# Patient Record
Sex: MALE | Race: WHITE
[De-identification: names, ages, dates, MRNs, and addresses within clinical notes are randomized per-mention and may not be internally consistent; named-entity substitution may affect disease eponyms.]

---

## 2017-02-06 ENCOUNTER — HOSPITAL ENCOUNTER (EMERGENCY)
Dept: HOSPITAL 25 - UCEAST | Age: 42
Discharge: LEFT BEFORE DISCHARGE | End: 2017-02-06
Payer: COMMERCIAL

## 2017-02-06 VITALS — SYSTOLIC BLOOD PRESSURE: 120 MMHG | DIASTOLIC BLOOD PRESSURE: 78 MMHG

## 2017-02-06 DIAGNOSIS — R06.00: Primary | ICD-10-CM

## 2017-02-06 DIAGNOSIS — J81.1: ICD-10-CM

## 2017-02-06 DIAGNOSIS — F17.210: ICD-10-CM

## 2017-02-06 DIAGNOSIS — J98.01: ICD-10-CM

## 2017-02-06 PROCEDURE — 71020: CPT

## 2017-02-06 PROCEDURE — 99212 OFFICE O/P EST SF 10 MIN: CPT

## 2017-02-06 PROCEDURE — 93005 ELECTROCARDIOGRAM TRACING: CPT

## 2017-02-06 PROCEDURE — G0463 HOSPITAL OUTPT CLINIC VISIT: HCPCS

## 2017-02-06 NOTE — UC
Respiratory Complaint HPI





- HPI Summary


HPI Summary: 





The patient comes in today for:





1.   Shortness of breath:





Onset: 2-3 days ago.  


Palliative/provocative:  Inhaler he had at home (albuterol) helped. 


Quality: Cough, barky


Region: Lungs


Severity: Just before he came in dyspnea was 7/10 but now after nebulizer 4/10


Time:  Constant.


Associated symptoms:


   Chest pain: None.


   Dyspnea: Present.


   Lung disease:  Bronchitis, but he denies any asthma or emphysema.  HE was 

given albuterol inhaler back in November of 2016.  He had a bronchitis and he 

got a "Z-pack and albuterol inhaler."  He got a breathing treatment also.  He 

did not get any oxygen.  


   Heart disease, but message from the nurse was that he had cardiomyopathy.


   Sleep apnea:  Present.  He has not seen anyone for this "not for a while." 

He has not been on any treatment (i.e. CPAP) at home.  He has not had any 

echocardiogram.


   Regular PCP: Dr. Quezada. His last visit was in August.  He has an 

appointment on the 20th of this month.  He was seen in August for a regular 

physical (for his job as a ). He denies seeing any other providers 

such as specialists. 


   Cough production:  Yellow green.


   Rhinitis:  Yellow/green


   Upper tooth pain: None


   Sinus pressure:  Frontal and maxillary.


   Oxygen at home: None.








*





- History of Current Complaint


Chief Complaint: UCRespiratory


Stated Complaint: URI


Time Seen by Provider: 02/06/17 18:20


Hx Obtained From: Patient, Family/Caretaker





- Allergies/Home Medications


Allergies/Adverse Reactions: 


 Allergies











Allergy/AdvReac Type Severity Reaction Status Date / Time


 


[place] Allergy Severe Swelling Uncoded 02/06/17 17:48














PMH/Surg Hx/FS Hx/Imm Hx


Previously Healthy: No


Endocrine History Of: Reports: Diabetes - DM x 3 years.  On Rx, FBS ,


   Denies: Thyroid Disease


Cardiovascular History Of: 


   Denies: Cardiac Disorders, Hypertension, Pacemaker/ICD, Myocardial Infarction

, Congestive Heart Failure, Atrial Fibrillation, Deep Vein Thrombosis, Bleeding 

Disorders


Respiratory History Of: Reports: Bronchitis - Last dx Nov of 2016--used 

albuteral MDI then, but not after.


   Denies: COPD, Asthma


GI/ History Of: 


   Denies: Gastroesophageal Reflux, Ulcer, Gastrointestinal Bleed, Gall Bladder 

Disease, Kidney Stones, Diverticulitis, Renal Disease, Urosepsis


Neurological History Of: 


   Denies: TIA, CVA, Dementia, Seizures, Migraine


Psychological History Of: Reports: Depression


   Denies: Anxiety, Bipolar Disorder, Schizophrenia, Post Traumatic Stress 

Disorder


Cancer History Of: 


   Denies: Lung Cancer, Colorectal Cancer, Breast Cancer, Prostate Cancer, 

Cervical Cancer


Other History Of: Anticoagulant Therapy


   Negative For: HIV, Hepatitis B, Hepatitis C





- Surgical History


Surgical History: None





- Family History


Known Family History: Positive: Cardiac Disease, Hypertension, Diabetes, 

Seizure Disorder





- Social History


Occupation: Employed Full-time


Alcohol Use: Rare


Substance Use Type: Marijuana


Substance Use Comment - Amount & Last Used: 7/27/14


Smoking Status (MU): Current Every Day Smoker


Type: Cigarettes


Amount Used/How Often: 1 PPD


Household Exposure Type: Cigarettes





- Immunization History


Most Recent Influenza Vaccination: never


Most Recent Tetanus Shot: doesn't remember


Most Recent Pneumonia Vaccination: never





Review of Systems


Constitutional: Fever - He c/o fever, 100.2 at home.


Skin: Negative


Eyes: Negative


ENT: Nasal Discharge


Respiratory: Shortness Of Breath, Cough


Gastrointestinal: Diarrhea - Diarrhea today:  two stools today, mucous in the 

first one.


Genitourinary: Negative


All Other Systems Reviewed And Are Negative: Yes





Physical Exam


Triage Information Reviewed: Yes


Appearance: Well-Appearing, Well-Nourished, Obese


Vital Signs: 


 Initial Vital Signs











Temp  97.9 F   02/06/17 17:43


 


Pulse  79   02/06/17 17:43


 


Resp  24   02/06/17 17:43


 


BP  136/89   02/06/17 17:43


 


Pulse Ox  94   02/06/17 17:43











Vital Signs Reviewed: Yes


Eyes: Positive: Conjunctiva Clear


ENT: Positive: Hearing grossly normal, Pharyngeal erythema, Other: - Mallampati 

score: 2.  Negative: Nasal congestion, Nasal drainage, TM bulging, TM dull, TM 

red, Tonsillar swelling, Tonsillar exudate


Dental: Negative: Gross Decay/Caries @, Dental Fracture @


Neck: Positive: Supple, Nontender, No Lymphadenopathy.  Negative: Nuchal 

Rigidity


Respiratory: Positive: Chest non-tender, No respiratory distress, No accessory 

muscle use - It was difficult to assess accessory muscle use due to increased 

adipose tissue overlying chest wall., Wheezing - Scattered, and end 

expiratory..  Negative: Crackles


Cardiovascular: Positive: RRR, No Murmur


Abdomen Description: Positive: Nontender, No Organomegaly, Soft.  Negative: 

Distended, Guarding


Musculoskeletal: Positive: Strength Intact, ROM Intact


Neurological: Positive: Alert, Muscle Tone Normal


Psychological: Positive: Age Appropriate Behavior, Consolable


Skin: Positive: Other - He has hyperpigmented, and cyanotic lower, edematous 

legs, but he states that this is his normal..  Negative: rashes





[facility] Diagnostic Evaluation





- Laboratory


O2 Sat by Pulse Oximetry: 94





- Radiology


Xray Interpretation: Positive (See Comments) - MILD PULMONARY INTERSTITIAL EDEMA


Radiology Interpretation Completed By: Radiologist





Respiratory Course/Dx





- Course


Course Of Treatment: Patient states that he feels much better after his DuoNeb 

treatment and that he feels well enough to go home.  However, I told him I was 

concerned about his breathing and lung sounds and suggested a CXR.  The 

radiologist read it as mild pulmonary edema.  I mentioned this to the patient 

and that a simple upper respiratory infection with bronchospasm should not have 

pulmonary edema found on CXR.  And therefore, I suggested that he go to the ER 

for a more in-depth evaluation.  However, he did not want to do that tonight 

and said that he would consider it in the AM.  He did want an antibiotic and 

bronchodilator, steroid inhaler and antibiotics.





- Differential Dx/Diagnosis


Provider Diagnoses: Dyspnea.  Mild pulmonary edema.  bronchospasm





Discharge





- Discharge Plan


Condition: Stable


Disposition: AGAINST MEDICAL ADVICE


Patient Education Materials:  Heart Failure (ED), Dyspnea (ED), Bronchospasm (ED

)


Forms:  *Work Release


Referrals: 


Kirstin HUTCHINSON,Hari GARCIA [Primary Care Provider] - As Soon As Possible (If you are 

not going to the ER at this time, please see your primary care provider as soon 

as you can.  If you get worse, please re-consider going to the ER. )

## 2017-02-06 NOTE — RAD
HISTORY: Shortness of breath, history of cardiomyopathy



COMPARISONS: July 31, 2014



VIEWS: 2: Frontal dual-energy and lateral views of the chest.



FINDINGS:

CARDIOMEDIASTINAL SILHOUETTE: The cardiomediastinal silhouette is normal.

RONY: The rony are normal.

PLEURA: The costophrenic angles are sharp. No pleural abnormalities are noted.

LUNG PARENCHYMA: There is mild coarse reticular pattern with prominence of the central

pulmonary vasculature.

ABDOMEN: The upper abdomen is clear. There is no subphrenic gas.

BONES AND SOFT TISSUES: No bone or soft tissue abnormalities are noted.

OTHER: None.



IMPRESSION:

MILD PULMONARY INTERSTITIAL EDEMA

## 2017-02-07 NOTE — UC
Progress





- Progress Note


Progress Note: 





The patient was called today to see how well he was doing.  He did not answer 

my call, but a message was left that if he is having any problems or other 

issues, he is welcome to come back to see us.

## 2017-05-20 ENCOUNTER — HOSPITAL ENCOUNTER (EMERGENCY)
Dept: HOSPITAL 25 - ED | Age: 42
LOS: 1 days | Discharge: HOME | End: 2017-05-21
Payer: COMMERCIAL

## 2017-05-20 VITALS — SYSTOLIC BLOOD PRESSURE: 169 MMHG | DIASTOLIC BLOOD PRESSURE: 97 MMHG

## 2017-05-20 DIAGNOSIS — L02.214: Primary | ICD-10-CM

## 2017-05-20 DIAGNOSIS — F17.210: ICD-10-CM

## 2017-05-20 DIAGNOSIS — Z79.01: ICD-10-CM

## 2017-05-20 DIAGNOSIS — E11.65: ICD-10-CM

## 2017-05-20 LAB
ALBUMIN SERPL BCG-MCNC: 3.5 G/DL (ref 3.2–5.2)
ALP SERPL-CCNC: 45 U/L (ref 34–104)
ALT SERPL W P-5'-P-CCNC: 21 U/L (ref 7–52)
ANION GAP SERPL CALC-SCNC: 6 MMOL/L (ref 2–11)
AST SERPL-CCNC: 12 U/L (ref 13–39)
BUN SERPL-MCNC: 9 MG/DL (ref 6–24)
BUN/CREAT SERPL: 12.9 (ref 8–20)
CALCIUM SERPL-MCNC: 9 MG/DL (ref 8.6–10.3)
CHLORIDE SERPL-SCNC: 97 MMOL/L (ref 101–111)
GLOBULIN SER CALC-MCNC: 3.2 G/DL (ref 2–4)
GLUCOSE SERPL-MCNC: 330 MG/DL (ref 70–100)
HCO3 SERPL-SCNC: 27 MMOL/L (ref 22–32)
HCT VFR BLD AUTO: 48 % (ref 42–52)
HGB BLD-MCNC: 16.5 G/DL (ref 14–18)
MCH RBC QN AUTO: 29 PG (ref 27–31)
MCHC RBC AUTO-ENTMCNC: 34 G/DL (ref 31–36)
MCV RBC AUTO: 85 FL (ref 80–94)
POTASSIUM SERPL-SCNC: 3.8 MMOL/L (ref 3.5–5)
PROT SERPL-MCNC: 6.7 G/DL (ref 6.4–8.9)
RBC # BLD AUTO: 5.7 10^6/UL (ref 4–5.4)
SODIUM SERPL-SCNC: 130 MMOL/L (ref 133–145)
WBC # BLD AUTO: 11.9 10^3/UL (ref 3.5–10.8)

## 2017-05-20 PROCEDURE — 96374 THER/PROPH/DIAG INJ IV PUSH: CPT

## 2017-05-20 PROCEDURE — 87040 BLOOD CULTURE FOR BACTERIA: CPT

## 2017-05-20 PROCEDURE — 99282 EMERGENCY DEPT VISIT SF MDM: CPT

## 2017-05-20 PROCEDURE — 85610 PROTHROMBIN TIME: CPT

## 2017-05-20 PROCEDURE — 83605 ASSAY OF LACTIC ACID: CPT

## 2017-05-20 PROCEDURE — 96372 THER/PROPH/DIAG INJ SC/IM: CPT

## 2017-05-20 PROCEDURE — 85730 THROMBOPLASTIN TIME PARTIAL: CPT

## 2017-05-20 PROCEDURE — 85025 COMPLETE CBC W/AUTO DIFF WBC: CPT

## 2017-05-20 PROCEDURE — 10060 I&D ABSCESS SIMPLE/SINGLE: CPT

## 2017-05-20 PROCEDURE — 36415 COLL VENOUS BLD VENIPUNCTURE: CPT

## 2017-05-20 PROCEDURE — 80053 COMPREHEN METABOLIC PANEL: CPT

## 2017-05-21 NOTE — ED
Giovanni HANSEN Anna, scribed for Nicolas Gracia MD on 05/20/17 at 2300 .





GI/ HPI





- HPI Summary


HPI Summary: 


Patient is a 40 y/o male coming to [facility] presenting with the gradual onset of 

left-sided groin pain that began two days ago. He reports a sore area which 

began looking like a pimple but has grown in size and worsened in pain. He 

describes the severity of the pain as 9/10. Denies known hernias. He does not 

report digging in or otherwise bothering the sore. He reports feeling warm and 

having some diaphoresis and nausea. Denies emesis. He put Abx cream on the area 

but did not attempt drainage. The area was not draining when he took his shower 

this morning. The pain is exacerbated by movement. His history is significant 

for DM. 





Patient medications were reviewed this visit.





- History of Current Complaint


Chief Complaint: EDGeneral


Time Seen by Provider: 05/20/17 22:16


Stated Complaint: INFECTION BY PELVIS/ GENERAL


Hx Obtained From: Patient, Family/Caretaker - accompanied by wife


Onset/Duration: Started Days Ago, Still Present


Pain Intensity: 9





- Allergy/Home Medications


Allergies/Adverse Reactions: 


 Allergies











Allergy/AdvReac Type Severity Reaction Status Date / Time


 


[place] Allergy Severe Swelling Uncoded 02/06/17 17:48














PMH/Surg Hx/FS Hx/Imm Hx


Endocrine/Hematology History: Reports: Hx Anticoagulant Therapy, Hx Diabetes - 

DM x 3 years.  On Rx, FBS ,


   Denies: Hx Thyroid Disease


Cardiovascular History: 


   Denies: Hx Congestive Heart Failure, Hx Deep Vein Thrombosis, Hx Hypertension

, Hx Myocardial Infarction, Hx Pacemaker/ICD


Respiratory History: Reports: Hx Sleep Apnea


   Denies: Hx Asthma, Hx Chronic Obstructive Pulmonary Disease (COPD), Hx Lung 

Cancer


GI History: 


   Denies: Hx Gall Bladder Disease, Hx Gastrointestinal Bleed, Hx Ulcer, Hx 

Urosepsis


 History: 


   Denies: Hx Kidney Stones, Hx Renal Disease


Musculoskeletal History: Reports: Hx Back Problems


Neurological History: 


   Denies: Hx Dementia, Hx Migraine, Hx Seizures, Hx Transient Ischemic Attacks 

(TIA)


Psychiatric History: Reports: Hx Depression


   Denies: Hx Anxiety, Hx Schizophrenia, Hx Bipolar Disorder





- Immunization History


Date of Tetanus Vaccine: utd


Date of Influenza Vaccine: none


Infectious Disease History: No


Infectious Disease History: 


   Denies: Hx Hepatitis, Hx Human Immunodeficiency Virus (HIV), Traveled 

Outside the [place] in Last 30 Days





- Family History


Known Family History: Positive: Cardiac Disease, Hypertension, Diabetes, 

Respiratory Disease, Seizure Disorder





- Social History


Lives: With Family


Alcohol Use: Rare


Substance Use Type: Reports: Marijuana


Substance Use Comment - Amount & Last Used: 5/19/17


Hx Tobacco Use: Yes


Smoking Status (MU): Current Every Day Smoker


Type: Cigarettes


Amount Used/How Often: 1 PPD





Review of Systems


Positive: Skin Diaphoresis, Other - feeling warm


Negative: Erythema


Negative: Sore Throat


Negative: Chest Pain


Negative: Shortness Of Breath, Cough


Positive: Nausea.  Negative: Abdominal Pain, Vomiting, Diarrhea


Positive: pain.  Negative: dysuria, hematuria


Negative: Myalgia, Edema


Negative: Rash


Neurological: Other - Denies dizziness


All Other Systems Reviewed And Are Negative: Yes





Physical Exam





- Summary


Physical Exam Summary: 


Constitutional: Well-developed, Well-nourished, Alert. (-) Distressed


Skin: Fluctuant area to the left of his synthesis pubis. Warm, Dry


HENT: Normocephalic; Atraumatic


Eyes: Conjunctiva normal


Neck: Musculoskeletal ROM normal neck. (-) JVD, (-) Stridor, (-) Tracheal 

deviation


Cardio: Rhythm regular, rate normal, Heart sounds normal; Intact distal pulses; 

The pedal pulses are 2+ and symmetric. Radial pulses are 2+ and symmetric. (-) 

Murmur


Pulmonary/Chest wall: Effort normal. (-) Respiratory distress, (-) Wheezes, (-) 

Rales


Abd: Soft, (-) Tenderness, (-) Distension, (-) Guarding, (-) Rebound


Musculoskeletal: (-) Edema


Lymph: (-) Cervical adenopathy


Neuro: Alert, Oriented x3


Psych: Mood and affect Normal


Triage Information Reviewed: Yes


Vital Signs On Initial Exam: 


 Initial Vitals











Temp Pulse Resp BP Pulse Ox


 


 99.5 F   105   20   169/97   97 


 


 05/20/17 22:04  05/20/17 22:04  05/20/17 22:04  05/20/17 22:04  05/20/17 22:04











Vital Signs Reviewed: Yes





- Aby Coma Scale


Coma Scale Total: 15





Procedures





- Incision and Drainage


Site: left of synthesis pubis - moderate pus 


Anesthesia: Local


Instrument(s): Scalpel - 11 blade


Packing: Other - 1/4 inch packing





Diagnostics





- Vital Signs


 Vital Signs











  Temp Pulse Resp BP Pulse Ox


 


 05/20/17 22:13  99.5 F  105  18  169/97  95


 


 05/20/17 22:04  99.5 F  105  20  169/97  97














- Laboratory


Result Diagrams: 


 05/20/17 23:25





 05/20/17 23:25


Lab Statement: Any lab studies that have been ordered have been reviewed, and 

results considered in the medical decision making process.





GIGU Course/Dx





- Course


Assessment/Plan: Patient is a 40 y/o male coming to [facility] presenting with the 

gradual onset of left-sided groin pain that began two days ago. Incision and 

drainage was performed. Labs reveal WBC of 11.9 and glucose of 330. Patient 

will be discharged home with a prescription for Clindamycin and Tramadol. 

Patient and family are agreeable.





- Diagnoses


Provider Diagnoses: 


 Groin abscess, Hyperglycemia








Discharge





- Discharge Plan


Condition: Stable


Disposition: HOME


Prescriptions: 


Clindamycin CAP* [Cleocin 150 MG CAP*] 300 mg PO QID #40 cap


traMADol TAB* [Ultram*] 25 mg PO Q6HR PRN #10 tab MDD 4


 PRN Reason: Pain - Moderate To Severe


Patient Education Materials:  Clindamycin (By mouth), Tramadol (By mouth), 

Abscess (ED), Diabetic Hyperglycemia (ED)


Referrals: 


Hari Fulton MD [Primary Care Provider] - 


Additional Instructions: 


Follow up with primary care provider within 48 hours. Check blood sugar levels 

three times a day.


RETURN TO THE EMERGENCY DEPARTMENT FOR CHANGING OR WORSENING SYMPTOMS.





The documentation as recorded by the Giovanni bey Anna accurately reflects 

the service I personally performed and the decisions made by Basil senior Jerry, MD.

## 2017-05-24 ENCOUNTER — HOSPITAL ENCOUNTER (EMERGENCY)
Dept: HOSPITAL 25 - UCEAST | Age: 42
Discharge: LEFT BEFORE DISCHARGE | End: 2017-05-24
Payer: COMMERCIAL
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
## 2017-05-24 DIAGNOSIS — Z53.21: ICD-10-CM

## 2017-05-24 DIAGNOSIS — T14.8: Primary | ICD-10-CM

## 2018-02-12 ENCOUNTER — HOSPITAL ENCOUNTER (EMERGENCY)
Dept: HOSPITAL 25 - ED | Age: 43
LOS: 1 days | Discharge: HOME | End: 2018-02-13
Payer: COMMERCIAL

## 2018-02-12 DIAGNOSIS — R50.9: ICD-10-CM

## 2018-02-12 DIAGNOSIS — E11.9: ICD-10-CM

## 2018-02-12 DIAGNOSIS — R06.02: ICD-10-CM

## 2018-02-12 DIAGNOSIS — R07.89: Primary | ICD-10-CM

## 2018-02-12 DIAGNOSIS — F17.210: ICD-10-CM

## 2018-02-12 DIAGNOSIS — Z79.01: ICD-10-CM

## 2018-02-12 LAB
BASOPHILS # BLD AUTO: 0.1 10^3/UL (ref 0–0.2)
EOSINOPHIL # BLD AUTO: 0.2 10^3/UL (ref 0–0.6)
HCT VFR BLD AUTO: 49 % (ref 42–52)
HGB BLD-MCNC: 16.7 G/DL (ref 14–18)
INR PPP/BLD: 0.87 (ref 0.77–1.02)
LYMPHOCYTES # BLD AUTO: 2.8 10^3/UL (ref 1–4.8)
MCH RBC QN AUTO: 30 PG (ref 27–31)
MCHC RBC AUTO-ENTMCNC: 34 G/DL (ref 31–36)
MCV RBC AUTO: 86 FL (ref 80–94)
MONOCYTES # BLD AUTO: 0.6 10^3/UL (ref 0–0.8)
NEUTROPHILS # BLD AUTO: 6.7 10^3/UL (ref 1.5–7.7)
NRBC # BLD AUTO: 0 10^3/UL
NRBC BLD QL AUTO: 0.1
PLATELET # BLD AUTO: 279 10^3/UL (ref 150–450)
RBC # BLD AUTO: 5.65 10^6/UL (ref 4–5.4)
WBC # BLD AUTO: 10.5 10^3/UL (ref 3.5–10.8)

## 2018-02-12 PROCEDURE — 71045 X-RAY EXAM CHEST 1 VIEW: CPT

## 2018-02-12 PROCEDURE — 99283 EMERGENCY DEPT VISIT LOW MDM: CPT

## 2018-02-12 PROCEDURE — 82550 ASSAY OF CK (CPK): CPT

## 2018-02-12 PROCEDURE — 85379 FIBRIN DEGRADATION QUANT: CPT

## 2018-02-12 PROCEDURE — 84484 ASSAY OF TROPONIN QUANT: CPT

## 2018-02-12 PROCEDURE — 85025 COMPLETE CBC W/AUTO DIFF WBC: CPT

## 2018-02-12 PROCEDURE — 80053 COMPREHEN METABOLIC PANEL: CPT

## 2018-02-12 PROCEDURE — 83735 ASSAY OF MAGNESIUM: CPT

## 2018-02-12 PROCEDURE — 36415 COLL VENOUS BLD VENIPUNCTURE: CPT

## 2018-02-12 PROCEDURE — 93005 ELECTROCARDIOGRAM TRACING: CPT

## 2018-02-12 PROCEDURE — 85610 PROTHROMBIN TIME: CPT

## 2018-02-12 PROCEDURE — 85730 THROMBOPLASTIN TIME PARTIAL: CPT

## 2018-02-12 PROCEDURE — 83605 ASSAY OF LACTIC ACID: CPT

## 2018-02-12 PROCEDURE — 96372 THER/PROPH/DIAG INJ SC/IM: CPT

## 2018-02-12 NOTE — RAD
INDICATION: Chest pain



COMPARISON: Chest x-ray dated February 06, 2017

 

TECHNIQUE: Single AP view of the chest was obtained.



FINDINGS: 



The heart and mediastinum exhibit normal size and contour.



Similar to the prior chest x-ray there is fullness of the pulmonary vasculature with

diffuse mild reticulonodular density.



Visualized bones are normal for the patient's age.



IMPRESSION:  DEPENDING ON THE CLINICAL SETTING CHEST X-RAY FINDINGS COULD BE SEEN WITH

MILD PULMONARY EDEMA OR INCREASED PARENCHYMAL DENSITY RELATED TO VIRAL PNEUMONIA.

## 2018-02-13 VITALS — SYSTOLIC BLOOD PRESSURE: 136 MMHG | DIASTOLIC BLOOD PRESSURE: 80 MMHG

## 2018-02-13 NOTE — ED
Cintia HANSEN Thomas, scribed for Josse Avitia MD on 02/12/18 at 2324 .





HPI Chest Pain





- HPI Summary


HPI Summary: 





The patient is a 42 year old male presenting with chest pain that has been 

increasing over the last two weeks. The pain radiates to his neck. He describes 

a sensation of a pulled muscle. The pain is worse when he moves and breathes 

deeply. He reports he is a little short of breath. He denies any known 

trauma. 





- History of Current Complaint


Chief Complaint: EDChestWallPain


Time Seen by Provider: 02/12/18 22:53


Hx Obtained From: Patient


Onset/Duration: Started Weeks Ago - 2, Atraumatic, Still Present


Timing: Constant


Initial Severity: Mild


Current Severity: Severe


Pain Intensity: 9


Pain Scale Used: 0-10 Numeric


Chest Pain Radiates: Yes


Chest Pain Radiates To:: Neck


Aggravating Factor(s): Movement, Deep Breaths


Alleviating Factor(s): Nothing


Associated Signs and Symptoms: Positive: Chest Pain, Shortness of Breath - a 

little, Fever





- Allergy/Home Medications


Allergies/Adverse Reactions: 


 Allergies











Allergy/AdvReac Type Severity Reaction Status Date / Time


 


[place] Allergy Severe Swelling Uncoded 02/06/17 17:48














PMH/Surg Hx/FS Hx/Imm Hx


Endocrine/Hematology History: Reports: Hx Anticoagulant Therapy, Hx Diabetes - 

DM x 3 years.  On Rx, FBS ,


   Denies: Hx Thyroid Disease


Cardiovascular History: 


   Denies: Hx Congestive Heart Failure, Hx Deep Vein Thrombosis, Hx Hypertension

, Hx Myocardial Infarction, Hx Pacemaker/ICD


Respiratory History: Reports: Hx Sleep Apnea


   Denies: Hx Asthma, Hx Chronic Obstructive Pulmonary Disease (COPD), Hx Lung 

Cancer


GI History: 


   Denies: Hx Gall Bladder Disease, Hx Gastrointestinal Bleed, Hx Ulcer, Hx 

Urosepsis


 History: 


   Denies: Hx Kidney Stones, Hx Renal Disease


Musculoskeletal History: Reports: Hx Back Problems


Neurological History: 


   Denies: Hx Dementia, Hx Migraine, Hx Seizures, Hx Transient Ischemic Attacks 

(TIA)


Psychiatric History: Reports: Hx Depression


   Denies: Hx Anxiety, Hx Schizophrenia, Hx Bipolar Disorder





- Immunization History


Date of Tetanus Vaccine: utd


Date of Influenza Vaccine: none


Infectious Disease History: No


Infectious Disease History: 


   Denies: Hx Hepatitis, Hx Human Immunodeficiency Virus (HIV), Traveled 

Outside the [place] in Last 30 Days





- Family History


Known Family History: Positive: Cardiac Disease, Hypertension, Diabetes, 

Respiratory Disease, Seizure Disorder





- Social History


Alcohol Use: Rare


Substance Use Type: Reports: Marijuana


Substance Use Comment - Amount & Last Used: 5/19/17


Hx Tobacco Use: Yes


Smoking Status (MU): Current Every Day Smoker


Type: Cigarettes


Amount Used/How Often: 1 PPD





Review of Systems


Positive: Fever


Positive: Chest Pain


Positive: Shortness Of Breath - a little


All Other Systems Reviewed And Are Negative: Yes





Physical Exam





- Summary


Physical Exam Summary: 





VITAL SIGNS: Reviewed.


GENERAL: Patient is a morbidly obese MALE who is lying comfortable in the 

stretcher. Patient is not in any acute respiratory distress.


HEAD AND FACE: No signs of trauma. No ecchymosis, hematomas or skull 

depressions. No sinus tenderness.


EYES: PERRLA, EOMI x 2, No injected conjunctiva, no nystagmus.


EARS: Hearing grossly intact. Ear canals and tympanic membranes are within 

normal limits.


MOUTH: Oropharynx within normal limits.


NECK: Supple, trachea is midline, no adenopathy, no JVD, no carotid bruit, no c-

spine tenderness, neck with full ROM.


CHEST: Symmetric. He has diffuse tenderness in the upper chest wall on both 

sides. 


LUNGS: Clear to auscultation bilaterally. No wheezing or crackles.


CVS: Regular rate and rhythm, S1 and S2 present, no murmurs or gallops 

appreciated.


ABDOMEN: Soft, non-tender. No signs of distention. No rebound no guarding, and 

no masses palpated. Bowel sounds are normal.


EXTREMITIES: FROM in all major joints, no edema, no cyanosis or clubbing.


NEURO: Alert and oriented x 3. No acute neurological deficits. Speech is normal 

and follows commands.


SKIN: Dry and warm


Triage Information Reviewed: Yes


Vital Signs On Initial Exam: 


 Initial Vitals











Temp Pulse Resp BP Pulse Ox


 


 100.2 F   104   16   137/66   96 


 


 02/12/18 19:02  02/12/18 19:02  02/12/18 19:02  02/12/18 19:02  02/12/18 19:02











Vital Signs Reviewed: Yes





Diagnostics





- Vital Signs


 Vital Signs











  Temp Pulse Resp BP Pulse Ox


 


 02/12/18 23:19   72  16  132/80  96


 


 02/12/18 21:03  98.7 F  94   131/66  95


 


 02/12/18 19:02  100.2 F  104  16  137/66  96














- Laboratory


Lab Results: 


 Lab Results











  02/12/18 02/12/18 02/12/18 Range/Units





  21:10 21:10 21:10 


 


WBC  10.5    (3.5-10.8)  10^3/ul


 


RBC  5.65 H    (4.0-5.4)  10^6/ul


 


Hgb  16.7    (14.0-18.0)  g/dl


 


Hct  49    (42-52)  %


 


MCV  86    (80-94)  fL


 


MCH  30    (27-31)  pg


 


MCHC  34    (31-36)  g/dl


 


RDW  14    (10.5-15)  %


 


Plt Count  279    (150-450)  10^3/ul


 


MPV  8    (7.4-10.4)  um3


 


Neut % (Auto)  63.6    (38-83)  %


 


Lymph % (Auto)  27.1    (25-47)  %


 


Mono % (Auto)  6.1    (1-9)  %


 


Eos % (Auto)  2.1    (0-6)  %


 


Baso % (Auto)  1.1    (0-2)  %


 


Absolute Neuts (auto)  6.7    (1.5-7.7)  10^3/ul


 


Absolute Lymphs (auto)  2.8    (1.0-4.8)  10^3/ul


 


Absolute Monos (auto)  0.6    (0-0.8)  10^3/ul


 


Absolute Eos (auto)  0.2    (0-0.6)  10^3/ul


 


Absolute Basos (auto)  0.1    (0-0.2)  10^3/ul


 


Absolute Nucleated RBC  0    10^3/ul


 


Nucleated RBC %  0.1    


 


INR (Anticoag Therapy)     (0.77-1.02)  


 


APTT     (26.0-36.3)  seconds


 


D-Dimer, Quantitative     (Less Than 230)  ng/mL


 


Sodium   133   (133-145)  mmol/L


 


Potassium   4.2   (3.5-5.0)  mmol/L


 


Chloride   100 L   (101-111)  mmol/L


 


Carbon Dioxide   26   (22-32)  mmol/L


 


Anion Gap   7   (2-11)  mmol/L


 


BUN   10   (6-24)  mg/dL


 


Creatinine   0.64 L   (0.67-1.17)  mg/dL


 


Est GFR ( Amer)   175.5   (>60)  


 


Est GFR (Non-Af Amer)   136.5   (>60)  


 


BUN/Creatinine Ratio   15.6   (8-20)  


 


Glucose   310 H   ()  mg/dL


 


Lactic Acid    2.1 H*  (0.5-2.0)  mmol/L


 


Calcium   8.8   (8.6-10.3)  mg/dL


 


Magnesium   2.1   (1.9-2.7)  mg/dL


 


Total Bilirubin   0.40   (0.2-1.0)  mg/dL


 


AST   13   (13-39)  U/L


 


ALT   19   (7-52)  U/L


 


Alkaline Phosphatase   49   ()  U/L


 


Total Creatine Kinase   38   ()  U/L


 


Troponin I   0.00   (<0.04)  ng/mL


 


Total Protein   6.7   (6.4-8.9)  g/dL


 


Albumin   3.7   (3.2-5.2)  g/dL


 


Globulin   3.0   (2-4)  g/dL


 


Albumin/Globulin Ratio   1.2   (1-3)  














  02/12/18 Range/Units





  21:10 


 


WBC   (3.5-10.8)  10^3/ul


 


RBC   (4.0-5.4)  10^6/ul


 


Hgb   (14.0-18.0)  g/dl


 


Hct   (42-52)  %


 


MCV   (80-94)  fL


 


MCH   (27-31)  pg


 


MCHC   (31-36)  g/dl


 


RDW   (10.5-15)  %


 


Plt Count   (150-450)  10^3/ul


 


MPV   (7.4-10.4)  um3


 


Neut % (Auto)   (38-83)  %


 


Lymph % (Auto)   (25-47)  %


 


Mono % (Auto)   (1-9)  %


 


Eos % (Auto)   (0-6)  %


 


Baso % (Auto)   (0-2)  %


 


Absolute Neuts (auto)   (1.5-7.7)  10^3/ul


 


Absolute Lymphs (auto)   (1.0-4.8)  10^3/ul


 


Absolute Monos (auto)   (0-0.8)  10^3/ul


 


Absolute Eos (auto)   (0-0.6)  10^3/ul


 


Absolute Basos (auto)   (0-0.2)  10^3/ul


 


Absolute Nucleated RBC   10^3/ul


 


Nucleated RBC %   


 


INR (Anticoag Therapy)  0.87  (0.77-1.02)  


 


APTT  32.4  (26.0-36.3)  seconds


 


D-Dimer, Quantitative  < 200  (Less Than 230)  ng/mL


 


Sodium   (133-145)  mmol/L


 


Potassium   (3.5-5.0)  mmol/L


 


Chloride   (101-111)  mmol/L


 


Carbon Dioxide   (22-32)  mmol/L


 


Anion Gap   (2-11)  mmol/L


 


BUN   (6-24)  mg/dL


 


Creatinine   (0.67-1.17)  mg/dL


 


Est GFR ( Amer)   (>60)  


 


Est GFR (Non-Af Amer)   (>60)  


 


BUN/Creatinine Ratio   (8-20)  


 


Glucose   ()  mg/dL


 


Lactic Acid   (0.5-2.0)  mmol/L


 


Calcium   (8.6-10.3)  mg/dL


 


Magnesium   (1.9-2.7)  mg/dL


 


Total Bilirubin   (0.2-1.0)  mg/dL


 


AST   (13-39)  U/L


 


ALT   (7-52)  U/L


 


Alkaline Phosphatase   ()  U/L


 


Total Creatine Kinase   ()  U/L


 


Troponin I   (<0.04)  ng/mL


 


Total Protein   (6.4-8.9)  g/dL


 


Albumin   (3.2-5.2)  g/dL


 


Globulin   (2-4)  g/dL


 


Albumin/Globulin Ratio   (1-3)  











Result Diagrams: 


 02/12/18 21:10





 02/12/18 21:10


Lab Statement: Any lab studies that have been ordered have been reviewed, and 

results considered in the medical decision making process.





- Radiology


  ** CXR


Xray Interpretation: Positive (See Comments) - DEPENDING ON THE CLINICAL 

SETTING CHEST X-RAY FINDINGS COULD BE SEEN WITH MILD PULMONARY EDEMA OR 

INCREASED PARENCHYMAL DENSITY RELATED TO VIRAL PNEUMONIA. Dr. Avitia has 

reviewed this report.


Radiology Interpretation Completed By: Radiologist





- EKG


  ** 19:04


Cardiac Rate: NL


EKG Rhythm: Sinus Rhythm - at 97 BPM


EKG Interpretation: Normal intervals, normal axis, no acute ischemic change





Chest Pain Course/Dx





- Course


Assessment/Plan: The patient is a 42 year old male presenting with chest pain 

that has been increasing over the last two weeks. In the ED course, the patient 

was given insulin, Percocet, and Toradol. Bloodwork was obtained. EKG and CXR 

were obtained. The patient is discharged home with diagnosis of chest wall 

pain. He will follow up with primary care.





- Diagnoses


Provider Diagnoses: 


 Chest wall pain








Discharge





- Discharge Plan


Condition: Stable


Disposition: HOME


Prescriptions: 


oxyCODONE/Acetamin 5/325 MG* [Percocet 5/325 TAB*] 1 tab PO Q6H PRN #14 tab MDD 

4


 PRN Reason: Pain


Patient Education Materials:  Chest Wall Pain (ED)


Referrals: 


Kirstin HUTCHINSON,Hari GARCIA [Primary Care Provider] - 3 Days


Additional Instructions: 


Follow up with your primary care physician in three days. 





Return to the emergency department for any new or worsening symptoms. 





The documentation as recorded by the Cintia bey Thomas accurately reflects 

the service I personally performed and the decisions made by me, Josse Avitia MD.

## 2018-04-27 ENCOUNTER — HOSPITAL ENCOUNTER (EMERGENCY)
Dept: HOSPITAL 25 - ED | Age: 43
Discharge: HOME | End: 2018-04-27
Payer: COMMERCIAL

## 2018-04-27 VITALS — SYSTOLIC BLOOD PRESSURE: 130 MMHG | DIASTOLIC BLOOD PRESSURE: 69 MMHG

## 2018-04-27 DIAGNOSIS — E66.01: ICD-10-CM

## 2018-04-27 DIAGNOSIS — N39.0: Primary | ICD-10-CM

## 2018-04-27 DIAGNOSIS — Z79.01: ICD-10-CM

## 2018-04-27 DIAGNOSIS — F32.9: ICD-10-CM

## 2018-04-27 DIAGNOSIS — E11.65: ICD-10-CM

## 2018-04-27 DIAGNOSIS — F17.210: ICD-10-CM

## 2018-04-27 LAB
HCT VFR BLD AUTO: 49 % (ref 42–52)
HGB BLD-MCNC: 16.9 G/DL (ref 14–18)
MCH RBC QN AUTO: 29 PG (ref 27–31)
MCHC RBC AUTO-ENTMCNC: 35 G/DL (ref 31–36)
MCV RBC AUTO: 84 FL (ref 80–94)
PLATELET # BLD AUTO: 232 10^3/UL (ref 150–450)
RBC # BLD AUTO: 5.8 10^6/UL (ref 4–5.4)
WBC # BLD AUTO: 8.7 10^3/UL (ref 3.5–10.8)

## 2018-04-27 PROCEDURE — 36415 COLL VENOUS BLD VENIPUNCTURE: CPT

## 2018-04-27 PROCEDURE — 81003 URINALYSIS AUTO W/O SCOPE: CPT

## 2018-04-27 PROCEDURE — 87040 BLOOD CULTURE FOR BACTERIA: CPT

## 2018-04-27 PROCEDURE — 74176 CT ABD & PELVIS W/O CONTRAST: CPT

## 2018-04-27 PROCEDURE — 83690 ASSAY OF LIPASE: CPT

## 2018-04-27 PROCEDURE — 86140 C-REACTIVE PROTEIN: CPT

## 2018-04-27 PROCEDURE — 87086 URINE CULTURE/COLONY COUNT: CPT

## 2018-04-27 PROCEDURE — 96372 THER/PROPH/DIAG INJ SC/IM: CPT

## 2018-04-27 PROCEDURE — 85025 COMPLETE CBC W/AUTO DIFF WBC: CPT

## 2018-04-27 PROCEDURE — 99282 EMERGENCY DEPT VISIT SF MDM: CPT

## 2018-04-27 PROCEDURE — 83605 ASSAY OF LACTIC ACID: CPT

## 2018-04-27 PROCEDURE — 80053 COMPREHEN METABOLIC PANEL: CPT

## 2018-04-27 PROCEDURE — 81015 MICROSCOPIC EXAM OF URINE: CPT

## 2018-04-27 NOTE — RAD
CLINICAL HISTORY: Flank pain, urinary difficulty



COMPARISON: None



TECHNIQUE: Multiple contiguous axial CT scans were obtained of the abdomen and pelvis,

without intravenous contrast enhancement. Coronal and sagittal multiplanar reformations

are submitted for review.  Oral contrast was not administered.     



FINDINGS: 

The study is limited by the lack of intravenous contrast. This limits evaluation of the

solid organs and vasculature. Evaluation is also limited by patient body habitus.





LUNG BASES: The lung bases are clear.



LIVER: The liver is diffusely low in attenuation compared to the spleen. There are no

focal hepatic parenchymal masses. The liver measures 25 cm in long axis.

BILE DUCTS: There is no intrahepatic or extrahepatic biliary dilatation.

GALLBLADDER: The gallbladder is normal, without pericholecystic inflammatory change.



PANCREAS: The pancreas is normal, without mass or ductal dilatation.

SPLEEN: Normal in size and appearance.



UPPER GI TRACT: Evaluation of the gastrointestinal tract is limited by incomplete gastric

distention. The upper GI tract is unremarkable.

SMALL BOWEL AND MESENTERY: The small bowel is normal in contour, course, and caliber.

There is no obstruction or dilatation.

COLON: The colon is normal in contour, course, caliber. There is no pericolonic

inflammatory change.



ADRENALS: Normal bilaterally.

KIDNEYS: The kidneys are normal in shape, size, contour, and axis. There is no

hydronephrosis or nephrolithiasis.

BLADDER: The bladder is smooth in contour.



PELVIC ORGANS: The prostate gland is normal. The seminal vesicles are symmetric.



AORTA: The aorta is normal.

IVC: Unremarkable



LYMPH NODES: There is no lymphadenopathy by size criteria.



ABDOMINAL WALL: There is no evidence for abdominal wall hernia.

BONES AND SOFT TISSUES: Unremarkable

OTHER: None



IMPRESSION:

HEPATOMEGALY WITH FATTY INFILTRATION OF THE LIVER.

NO APPRECIABLE HYDRONEPHROSIS OR NEPHROLITHIASIS.

## 2018-04-27 NOTE — ED
Joao HANSEN Rebecca, scribed for Bennett Gibson MD on 04/27/18 at 0206 .





Abdominal Pain/Male





- HPI Summary


HPI Summary: 


Pt is a 41 y/o M who presents to ED c/o R flank pain. Sx have been present for 

about 2-3 days and have been intermittent since onset. Pain is sharp and ranked 

8/10 on triage. Additionally c/o nausea, difficulty urinating, and dysuria that 

is described as a discomfort. Denies vomiting, hematuria, and penile discharge. 

PMHx DM - BG has been below 150 and takes PO medication. 








- History of Current Complaint


Chief Complaint: EDFlankPain


Stated Complaint: ABD PAIN


Time Seen by Provider: 04/27/18 01:58


Hx Obtained From: Patient


Onset/Duration: Lasting Days - 2-3 days, Still Present


Timing: Intermittent


Severity Currently: Severe


Pain Intensity: 8


Pain Scale Used: 0-10 Numeric


Location: Flank - Right


Character: Sharp


Associated Signs And Symptoms: Positive: Nausea.  Negative: Vomiting, Penile 

Discharge





- Allergies/Home Medications


Allergies/Adverse Reactions: 


 Allergies











Allergy/AdvReac Type Severity Reaction Status Date / Time


 


[place] Allergy Severe Swelling Uncoded 02/06/17 17:48














PMH/Surg Hx/FS Hx/Imm Hx


Endocrine/Hematology History: Reports: Hx Anticoagulant Therapy, Hx Diabetes - 

DM x 3 years.  On Rx, FBS ,


   Denies: Hx Thyroid Disease


Cardiovascular History: 


   Denies: Hx Congestive Heart Failure, Hx Deep Vein Thrombosis, Hx Hypertension

, Hx Myocardial Infarction, Hx Pacemaker/ICD


Respiratory History: Reports: Hx Sleep Apnea


   Denies: Hx Asthma, Hx Chronic Obstructive Pulmonary Disease (COPD), Hx Lung 

Cancer


GI History: 


   Denies: Hx Gall Bladder Disease, Hx Gastrointestinal Bleed, Hx Ulcer, Hx 

Urosepsis


 History: 


   Denies: Hx Kidney Stones, Hx Renal Disease


Musculoskeletal History: Reports: Hx Back Problems


Neurological History: 


   Denies: Hx Dementia, Hx Migraine, Hx Seizures, Hx Transient Ischemic Attacks 

(TIA)


Psychiatric History: Reports: Hx Depression


   Denies: Hx Anxiety, Hx Schizophrenia, Hx Bipolar Disorder





- Immunization History


Date of Tetanus Vaccine: utd


Date of Influenza Vaccine: none


Infectious Disease History: No


Infectious Disease History: 


   Denies: Hx Hepatitis, Hx Human Immunodeficiency Virus (HIV), Traveled 

Outside the [place] in Last 30 Days





- Family History


Known Family History: Positive: Cardiac Disease, Hypertension, Diabetes, 

Respiratory Disease, Seizure Disorder





- Social History


Alcohol Use: Rare


Substance Use Type: Reports: Marijuana


Substance Use Comment - Amount & Last Used: 5/19/17


Hx Tobacco Use: Yes


Smoking Status (MU): Current Every Day Smoker


Type: Cigarettes


Amount Used/How Often: 1 PPD





Review of Systems


Positive: Nausea.  Negative: Vomiting


Positive: dysuria, flank pain - Right, other - Difficulty urinating.  Negative: 

discharge, hematuria


All Other Systems Reviewed And Are Negative: Yes





Physical Exam





- Summary


Physical Exam Summary: 


Appearance: Well appearing, no pain distress, morbidly obese


Skin: warm, dry, reflects adequate perfusion


Head/face: normal


Eyes: EOMI, EMILIANO


ENT: normal


Neck: supple, non-tender


Respiratory: CTA, breath sounds present


Cardiovascular: RRR, pulses symmetrical 


Abdomen: non-tender, soft


Bowel Sounds: present


Musculoskeletal: strength/ROM intact, tenderness to palpation in the bilateral 

paralumbar musculature, no CVA tenderness


Neuro: normal, sensory motor intact, A&Ox3





Triage Information Reviewed: Yes


Vital Signs On Initial Exam: 


 Initial Vitals











Temp Pulse Resp BP Pulse Ox


 


 96.7 F   105   24   114/77   94 


 


 04/27/18 00:28  04/27/18 00:28  04/27/18 00:28  04/27/18 00:28  04/27/18 00:28











Vital Signs Reviewed: Yes





Diagnostics





- Vital Signs


 Vital Signs











  Temp Pulse Resp BP Pulse Ox


 


 04/27/18 00:28  96.7 F  105  24  114/77  94














- Laboratory


Lab Results: 


 Lab Results











  04/27/18 04/27/18 04/27/18 Range/Units





  01:40 01:40 01:40 


 


WBC   8.7   (3.5-10.8)  10^3/ul


 


RBC   5.80 H   (4.0-5.4)  10^6/ul


 


Hgb   16.9   (14.0-18.0)  g/dl


 


Hct   49   (42-52)  %


 


MCV   84   (80-94)  fL


 


MCH   29   (27-31)  pg


 


MCHC   35   (31-36)  g/dl


 


RDW   14   (10.5-15)  %


 


Plt Count   232   (150-450)  10^3/ul


 


MPV   7.7   (7.4-10.4)  um3


 


Neut % (Auto)   Not Reportable   


 


Lymph % (Auto)   Not Reportable   


 


Mono % (Auto)   Not Reportable   


 


Eos % (Auto)   Not Reportable   


 


Baso % (Auto)   Not Reportable   


 


Absolute Neuts (auto)   Not Reportable   


 


Absolute Lymphs (auto)   Not Reportable   


 


Absolute Monos (auto)   Not Reportable   


 


Absolute Eos (auto)   Not Reportable   


 


Absolute Basos (auto)   Not Reportable   


 


Absolute Nucleated RBC   Not Reportable   


 


Immature Gran %   2   (0-9)  %


 


Neutrophils %   43   (38-83)  %


 


Band Neutrophils %   2   (0-8)  %


 


Lymphocytes %   31   (25-47)  %


 


Reactive Lymphs %   14 H   (0-6)  %


 


Monocytes %   9 H   (0-7)  %


 


Eosinophils %   1   (0-6)  %


 


Basophils %   0   (0-2)  %


 


Nucleated RBC %   Not Reportable   


 


Abs Neuts (Manual)   3.7   (1.5-7.7)  10^3/ul


 


Abs Lymphs (Manual)   2.7   (1.0-4.8)  10^3/ul


 


Abs Monocytes (Manual)   0.8   (0-0.8)  10^3/ul


 


Absolute Eos (Manual)   0.1   (0-0.6)  10^3/ul


 


Abs Basophils (Manual)   0   (0-0.2)  10^3/ul


 


Normal RBC Morphology   Normal   (Normal)  


 


Sodium  134 L    (139-145)  mmol/L


 


Potassium  4.2    (3.5-5.0)  mmol/L


 


Chloride  98 L    (101-111)  mmol/L


 


Carbon Dioxide  24    (22-32)  mmol/L


 


Anion Gap  12 H    (2-11)  mmol/L


 


BUN  10    (6-24)  mg/dL


 


Creatinine  0.64 L    (0.67-1.17)  mg/dL


 


Est GFR ( Amer)  175.5    (>60)  


 


Est GFR (Non-Af Amer)  136.5    (>60)  


 


BUN/Creatinine Ratio  15.6    (8-20)  


 


Glucose  314 H    ()  mg/dL


 


Lactic Acid    2.7 H*  (0.5-2.0)  mmol/L


 


Calcium  8.9    (8.6-10.3)  mg/dL


 


Total Bilirubin  0.70    (0.2-1.0)  mg/dL


 


AST  38    (13-39)  U/L


 


ALT  57 H    (7-52)  U/L


 


Alkaline Phosphatase  67    ()  U/L


 


C-Reactive Protein  41.59 H    (< 5.00)  mg/L


 


Total Protein  6.8    (6.4-8.9)  g/dL


 


Albumin  3.6    (3.2-5.2)  g/dL


 


Globulin  3.2    (2-4)  g/dL


 


Albumin/Globulin Ratio  1.1    (1-3)  


 


Lipase  136 H    (11.0-82.0)  U/L


 


Urine Color     


 


Urine Appearance     


 


Urine pH     (5-9)  


 


Ur Specific Gravity     (1.010-1.030)  


 


Urine Protein     (Negative)  


 


Urine Ketones     (Negative)  


 


Urine Blood     (Negative)  


 


Urine Nitrate     (Negative)  


 


Urine Bilirubin     (Negative)  


 


Urine Urobilinogen     (Negative)  


 


Ur Leukocyte Esterase     (Negative)  


 


Urine WBC (Auto)     (Absent)  


 


Urine RBC (Auto)     (Absent)  


 


Ur Squamous Epith Cells     (Absent)  


 


Urine Bacteria     (Absent)  


 


Urine Glucose     (Negative)  














  04/27/18 Range/Units





  02:30 


 


WBC   (3.5-10.8)  10^3/ul


 


RBC   (4.0-5.4)  10^6/ul


 


Hgb   (14.0-18.0)  g/dl


 


Hct   (42-52)  %


 


MCV   (80-94)  fL


 


MCH   (27-31)  pg


 


MCHC   (31-36)  g/dl


 


RDW   (10.5-15)  %


 


Plt Count   (150-450)  10^3/ul


 


MPV   (7.4-10.4)  um3


 


Neut % (Auto)   


 


Lymph % (Auto)   


 


Mono % (Auto)   


 


Eos % (Auto)   


 


Baso % (Auto)   


 


Absolute Neuts (auto)   


 


Absolute Lymphs (auto)   


 


Absolute Monos (auto)   


 


Absolute Eos (auto)   


 


Absolute Basos (auto)   


 


Absolute Nucleated RBC   


 


Immature Gran %   (0-9)  %


 


Neutrophils %   (38-83)  %


 


Band Neutrophils %   (0-8)  %


 


Lymphocytes %   (25-47)  %


 


Reactive Lymphs %   (0-6)  %


 


Monocytes %   (0-7)  %


 


Eosinophils %   (0-6)  %


 


Basophils %   (0-2)  %


 


Nucleated RBC %   


 


Abs Neuts (Manual)   (1.5-7.7)  10^3/ul


 


Abs Lymphs (Manual)   (1.0-4.8)  10^3/ul


 


Abs Monocytes (Manual)   (0-0.8)  10^3/ul


 


Absolute Eos (Manual)   (0-0.6)  10^3/ul


 


Abs Basophils (Manual)   (0-0.2)  10^3/ul


 


Normal RBC Morphology   (Normal)  


 


Sodium   (139-145)  mmol/L


 


Potassium   (3.5-5.0)  mmol/L


 


Chloride   (101-111)  mmol/L


 


Carbon Dioxide   (22-32)  mmol/L


 


Anion Gap   (2-11)  mmol/L


 


BUN   (6-24)  mg/dL


 


Creatinine   (0.67-1.17)  mg/dL


 


Est GFR ( Amer)   (>60)  


 


Est GFR (Non-Af Amer)   (>60)  


 


BUN/Creatinine Ratio   (8-20)  


 


Glucose   ()  mg/dL


 


Lactic Acid   (0.5-2.0)  mmol/L


 


Calcium   (8.6-10.3)  mg/dL


 


Total Bilirubin   (0.2-1.0)  mg/dL


 


AST   (13-39)  U/L


 


ALT   (7-52)  U/L


 


Alkaline Phosphatase   ()  U/L


 


C-Reactive Protein   (< 5.00)  mg/L


 


Total Protein   (6.4-8.9)  g/dL


 


Albumin   (3.2-5.2)  g/dL


 


Globulin   (2-4)  g/dL


 


Albumin/Globulin Ratio   (1-3)  


 


Lipase   (11.0-82.0)  U/L


 


Urine Color  Yellow  


 


Urine Appearance  Clear  


 


Urine pH  5.0  (5-9)  


 


Ur Specific Gravity  1.032 H  (1.010-1.030)  


 


Urine Protein  Negative  (Negative)  


 


Urine Ketones  Trace A  (Negative)  


 


Urine Blood  Negative  (Negative)  


 


Urine Nitrate  Negative  (Negative)  


 


Urine Bilirubin  Negative  (Negative)  


 


Urine Urobilinogen  Negative  (Negative)  


 


Ur Leukocyte Esterase  Trace A  (Negative)  


 


Urine WBC (Auto)  3+(>20/hpf) A  (Absent)  


 


Urine RBC (Auto)  2+(6-10/hpf) A  (Absent)  


 


Ur Squamous Epith Cells  Present A  (Absent)  


 


Urine Bacteria  1+ A  (Absent)  


 


Urine Glucose  3+(>=500 mg/dl) A  (Negative)  











Result Diagrams: 


 04/27/18 01:40





 04/27/18 01:40


Lab Statement: Any lab studies that have been ordered have been reviewed, and 

results considered in the medical decision making process.





- CT


  ** CT Abd/Pel


CT Interpretation: No Acute Changes - Enlarged fatty liver. ED physician 

reviewed this radiology report.


CT Interpretation Completed By: Radiologist





Re-Evaluation





- Re-Evaluation


  ** First Eval


Re-Evaluation Time: 03:45


Change: Improved


Comment: Discussed results and D/C plan with the pt. Pt is doing much better 

with pain completely resolved.





Abdominal Pain Fem Course/Dx





- Course


Course Of Treatment: Patient with right-sided flank pain as well as urinary 

symptoms.  No fever or vomiting.  Urine is dirty and intramuscular Rocephin was 

given.  Pain was gone with Toradol.  No evidence for stone on CT.  His sugars 

are modestly elevated which he will follow closely with his primary care 

physician.  Patient discharged in good condition to follow up with his primary 

care physician.





- Diagnoses


Differential Diagnosis/HQI/PQRI: Appendicitis, Gall Bladder Disease, 

Pancreatitis, Renal Colic, Urinary Tract Infection


Provider Diagnoses: 


 UTI (urinary tract infection), Diabetes mellitus with hyperglycemia, Morbid 

obesity








Discharge





- Sign-Out/Discharge


Documenting (check all that apply): Discharge/Admit/Transfer - Discharge





- Discharge Plan


Condition: Good


Disposition: HOME


Prescriptions: 


Ciprofloxacin HCl [Cipro] 500 mg PO BID #10 tablet


Phenazopyridine 200 mg (NF) [Pyridium 200 MG tab *] 200 mg PO TID PRN #9 tab


 PRN Reason: burning with urination


Patient Education Materials:  Urinary Tract Infection in Men (ED)


Forms:  *Work Release


Referrals: 


Kirstin HUTCHINSON,Hari GARCIA [Primary Care Provider] - 


Additional Instructions: 


Drink plenty of fluids.  Call your doctor in the morning for reevaluation.  

Your blood sugar was high.  Avoid carbohydrates and drink lots of water.  See 

her doctor about reevaluation of the blood sugar.





Return with fever, increased back pain, vomiting, worse or other concerns as 

discussed.  Tylenol, ibuprofen as needed for discomfort.





- Billing Disposition and Condition


Condition: GOOD


Disposition: HOME





The documentation as recorded by the Joao bey Rebecca accurately 

reflects the service I personally performed and the decisions made by me, 

Bennett Gibson MD.

## 2018-05-01 ENCOUNTER — HOSPITAL ENCOUNTER (EMERGENCY)
Dept: HOSPITAL 25 - ED | Age: 43
Discharge: LEFT BEFORE DISCHARGE | End: 2018-05-01
Payer: COMMERCIAL

## 2018-05-01 VITALS — SYSTOLIC BLOOD PRESSURE: 115 MMHG | DIASTOLIC BLOOD PRESSURE: 75 MMHG

## 2018-05-01 DIAGNOSIS — F17.210: ICD-10-CM

## 2018-05-01 DIAGNOSIS — R10.84: ICD-10-CM

## 2018-05-01 DIAGNOSIS — M54.9: Primary | ICD-10-CM

## 2018-05-01 DIAGNOSIS — R07.9: ICD-10-CM

## 2018-05-01 DIAGNOSIS — Z53.21: ICD-10-CM

## 2018-05-01 LAB
BASOPHILS # BLD AUTO: 0.1 10^3/UL (ref 0–0.2)
EOSINOPHIL # BLD AUTO: 0 10^3/UL (ref 0–0.6)
HCT VFR BLD AUTO: 46 % (ref 42–52)
HGB BLD-MCNC: 15.4 G/DL (ref 14–18)
LYMPHOCYTES # BLD AUTO: 5.5 10^3/UL (ref 1–4.8)
MCH RBC QN AUTO: 29 PG (ref 27–31)
MCHC RBC AUTO-ENTMCNC: 34 G/DL (ref 31–36)
MCV RBC AUTO: 84 FL (ref 80–94)
MONOCYTES # BLD AUTO: 0.7 10^3/UL (ref 0–0.8)
NEUTROPHILS # BLD AUTO: 3 10^3/UL (ref 1.5–7.7)
PLATELET # BLD AUTO: 238 10^3/UL (ref 150–450)
RBC # BLD AUTO: 5.4 10^6/UL (ref 4–5.4)
WBC # BLD AUTO: 9.3 10^3/UL (ref 3.5–10.8)

## 2018-05-01 PROCEDURE — 76705 ECHO EXAM OF ABDOMEN: CPT

## 2018-05-01 PROCEDURE — 83690 ASSAY OF LIPASE: CPT

## 2018-05-01 PROCEDURE — 99283 EMERGENCY DEPT VISIT LOW MDM: CPT

## 2018-05-01 PROCEDURE — 74176 CT ABD & PELVIS W/O CONTRAST: CPT

## 2018-05-01 PROCEDURE — 87591 N.GONORRHOEAE DNA AMP PROB: CPT

## 2018-05-01 PROCEDURE — 83605 ASSAY OF LACTIC ACID: CPT

## 2018-05-01 PROCEDURE — 96375 TX/PRO/DX INJ NEW DRUG ADDON: CPT

## 2018-05-01 PROCEDURE — 81003 URINALYSIS AUTO W/O SCOPE: CPT

## 2018-05-01 PROCEDURE — 87491 CHLMYD TRACH DNA AMP PROBE: CPT

## 2018-05-01 PROCEDURE — 71275 CT ANGIOGRAPHY CHEST: CPT

## 2018-05-01 PROCEDURE — 85025 COMPLETE CBC W/AUTO DIFF WBC: CPT

## 2018-05-01 PROCEDURE — 71045 X-RAY EXAM CHEST 1 VIEW: CPT

## 2018-05-01 PROCEDURE — 85379 FIBRIN DEGRADATION QUANT: CPT

## 2018-05-01 PROCEDURE — 93005 ELECTROCARDIOGRAM TRACING: CPT

## 2018-05-01 PROCEDURE — 86308 HETEROPHILE ANTIBODY SCREEN: CPT

## 2018-05-01 PROCEDURE — 96365 THER/PROPH/DIAG IV INF INIT: CPT

## 2018-05-01 PROCEDURE — 84484 ASSAY OF TROPONIN QUANT: CPT

## 2018-05-01 PROCEDURE — 80053 COMPREHEN METABOLIC PANEL: CPT

## 2018-05-01 PROCEDURE — 36415 COLL VENOUS BLD VENIPUNCTURE: CPT

## 2018-05-01 PROCEDURE — 85060 BLOOD SMEAR INTERPRETATION: CPT

## 2018-05-01 NOTE — RAD
HISTORY: Right-sided abdominal pain



COMPARISONS: CT dated May 01, 2018



TECHNIQUE: Multiple transverse and longitudinal ultrasound images were obtained of the

right upper quadrant of the abdomen using grayscale and color Doppler imaging.



FINDINGS: 

The study is limited by patient body habitus.

 



LIVER: The liver is diffusely echogenic and coarse in echotexture, with decreased acoustic

transmission. The liver measures 23.8 cm in long axis.  There is normal hepatopedal flow

of the portal vein on Doppler imaging.

BILIARY TREE: There is no intrahepatic or extrahepatic biliary dilatation.  The common

duct measures 0.5 cm.

GALLBLADDER: The gallbladder is well-visualized. There is no cholelithiasis, gallbladder

wall thickening, pericholecystic fluid, or sonographic Alston sign.



PANCREAS: The pancreas is obscured by overlying bowel gas.



RIGHT KIDNEY: The right kidney is normal in shape, size, contour, and echogenicity.  There

is no hydronephrosis or nephrolithiasis. The right kidney measures 14.7 x 7 x 8.5 cm. 



AORTA AND IVC: The vessels are not well visualized.     



FLUID: There are no pleural effusions. There is no free fluid within the hepatorenal

recess.



OTHER FINDINGS: None.



IMPRESSION: 

1.  LIMITED STUDY.

2.  HEPATOMEGALY WITH FATTY INFILTRATION OF THE LIVER

## 2018-05-01 NOTE — RAD
INDICATION: Chest pain. Short of breath. Evaluate for pulmonary embolus.      



COMPARISON: Chest x-ray May 01, 2018

 

TECHNIQUE: Axial source images were obtained from the thoracic inlet to the hemidiaphragms

following administration of 8  cc opaque 320. The examination was repeated with a second

injection of the same dose due to poor pulmonary arterial contrast opacification. CT

angiographic technique was utilized. Coronal and sagittal reconstructed images were

acquired.



This examination is limited despite an attempt with a repeat injection. There is a stated

body weight of 450 pounds which necessarily limits this examination.



CHEST FINDINGS:



Neck/thyroid: The visualized neck to include the thyroid appear normal.



Chest wall: There are no acute abnormalities of the bony thorax or chest wall. There is no

supraclavicular, infraclavicular, or axillary lymphadenopathy.



Lungs : There are no pulmonary parenchymal masses or infiltrates. The pulmonary

interstitium appears normal. There are no endobronchial lesions.



Cardiomediastinal structures: There is no evidence of a central pulmonary embolus although

this examination is considered very limited. The heart is normal in size. There is no

pericardial effusion.  There is no evidence of aortic aneurysm or dissection. There is no

mediastinal or hilar adenopathy. The esophagus appears normal.



Pleura : There are no pleural-based masses or effusions.



Other: None.



IMPRESSION:  VERY LIMITED EXAMINATION DUE TO PATIENT SIZE. NO CENTRAL PULMONARY EMBOLUS.

LUNGS CLEAR.



LIMITED NATURE OF THIS EXAMINATION DISCUSSED WITH THE EMERGENCY DEPARTMENT.

## 2018-05-01 NOTE — RAD
INDICATION: Chest pain



COMPARISON: Most recent comparison chest x-ray is dated February 12, 2018

 

TECHNIQUE: Single AP portable view of the chest was obtained.



FINDINGS: 



Image quality is compromised due to the relative inferiority of a portable chest x-ray.



The heart and mediastinum exhibit normal size and contour.



The lungs are grossly clear. There is no evidence of a large pleural effusion.



Visualized bones are normal for the patient's age.



IMPRESSION:  No radiographic evidence for acute cardiopulmonary abnormality on this

portable chest x-ray.

## 2018-05-01 NOTE — RAD
HISTORY: Left leg pain



TECHNIQUE: Multiple transverse and longitudinal ultrasound images were obtained of the

veins of the left lower extremity using grayscale, color Doppler, and spectral Doppler

imaging with and without compression and with augmentation. 



FINDINGS:



Evaluation is made difficult due to the patient's large body habitus.



VEINS: The common femoral vein, deep femoral vein, femoral vein and popliteal vein are

compressible throughout their course, with normal flow on color Doppler imaging and normal

response to augmentation on spectral Doppler imaging.



SOFT TISSUES: Incidentally noted is a top normal but morphologically normal-appearing

right groin lymph node measuring 0.9 x 2.7 cm transverse and up to 4.8 cm in length.



IMPRESSION: 

No sonographic evidence of deep vein thrombosis.

## 2018-05-01 NOTE — RAD
INDICATION: Urinary tract infection. Fever. RIGHT flank pain. Shortness of breath.

Diaphoretic.



COMPARISON:  February 27, 2018 CT.



TECHNIQUE: Multidetector CT images were obtained from the lung bases to the ischial

tuberosities. Evaluation of the viscera is limited without IV contrast. Multiplanar

reformation.



REPORT: Morbid obesity limits image quality.



Unremarkable visualized inferior thorax.



Fatty infiltration of the liver with focal sparing at the gallbladder fossa. No focal

hepatic lesions, or CT abnormality of the gallbladder, pancreas, or spleen evident.



Negative for CT abnormality of the upper GI, small bowel, or diminutive appendix. Mild

diverticulosis of the sigmoid colon without acute inflammatory change. Negative for

ascites, free air, or hernias.



Normal adrenal glands. Unremarkable unenhanced kidneys. Negative for nephrolithiasis,

hydronephrosis, focal renal lesions, or perinephric inflammatory stranding. Unremarkable

nondilated ureters and partially distended urinary bladder. Symmetric seminal vesicles.



Unchanged mildly prominent 1.5 cm short axis portal caval lymph node. Negative for

lymphadenopathy. Normal diameter abdominal aorta and iliac arteries. Physiologic

distention of the IVC.



Negative for suspicious osseous lesions.



IMPRESSION: 

1. Negative for urolithiasis or hydronephrosis.

2. Mild sigmoid diverticulosis without findings of diverticulitis.

3. Fatty infiltration of the liver.

## 2018-05-14 ENCOUNTER — HOSPITAL ENCOUNTER (INPATIENT)
Dept: HOSPITAL 25 - ED | Age: 43
LOS: 2 days | Discharge: HOME | DRG: 194 | End: 2018-05-16
Attending: HOSPITALIST | Admitting: HOSPITALIST
Payer: COMMERCIAL

## 2018-05-14 DIAGNOSIS — E11.9: ICD-10-CM

## 2018-05-14 DIAGNOSIS — Z87.440: ICD-10-CM

## 2018-05-14 DIAGNOSIS — Z83.3: ICD-10-CM

## 2018-05-14 DIAGNOSIS — Z83.6: ICD-10-CM

## 2018-05-14 DIAGNOSIS — Z79.82: ICD-10-CM

## 2018-05-14 DIAGNOSIS — Z72.89: ICD-10-CM

## 2018-05-14 DIAGNOSIS — F12.90: ICD-10-CM

## 2018-05-14 DIAGNOSIS — Z82.49: ICD-10-CM

## 2018-05-14 DIAGNOSIS — E66.01: ICD-10-CM

## 2018-05-14 DIAGNOSIS — Z79.84: ICD-10-CM

## 2018-05-14 DIAGNOSIS — R00.0: ICD-10-CM

## 2018-05-14 DIAGNOSIS — I07.1: ICD-10-CM

## 2018-05-14 DIAGNOSIS — I50.23: Primary | ICD-10-CM

## 2018-05-14 DIAGNOSIS — G47.33: ICD-10-CM

## 2018-05-14 DIAGNOSIS — F17.210: ICD-10-CM

## 2018-05-14 DIAGNOSIS — R74.0: ICD-10-CM

## 2018-05-14 DIAGNOSIS — I87.8: ICD-10-CM

## 2018-05-14 DIAGNOSIS — Z88.8: ICD-10-CM

## 2018-05-14 DIAGNOSIS — E11.65: ICD-10-CM

## 2018-05-14 DIAGNOSIS — Z82.0: ICD-10-CM

## 2018-05-14 DIAGNOSIS — F32.9: ICD-10-CM

## 2018-05-14 LAB
BASOPHILS # BLD AUTO: 0.1 10^3/UL (ref 0–0.2)
EOSINOPHIL # BLD AUTO: 0.1 10^3/UL (ref 0–0.6)
HCT VFR BLD AUTO: 42 % (ref 42–52)
HGB BLD-MCNC: 14.5 G/DL (ref 14–18)
INR PPP/BLD: 0.92 (ref 0.77–1.02)
LYMPHOCYTES # BLD AUTO: 2.8 10^3/UL (ref 1–4.8)
MCH RBC QN AUTO: 29 PG (ref 27–31)
MCHC RBC AUTO-ENTMCNC: 34 G/DL (ref 31–36)
MCV RBC AUTO: 86 FL (ref 80–94)
MONOCYTES # BLD AUTO: 0.4 10^3/UL (ref 0–0.8)
NEUTROPHILS # BLD AUTO: 3.2 10^3/UL (ref 1.5–7.7)
NRBC # BLD AUTO: 0 10^3/UL
NRBC BLD QL AUTO: 0.2
PLATELET # BLD AUTO: 249 10^3/UL (ref 150–450)
RBC # BLD AUTO: 4.91 10^6/UL (ref 4–5.4)
WBC # BLD AUTO: 6.6 10^3/UL (ref 3.5–10.8)

## 2018-05-14 PROCEDURE — 83880 ASSAY OF NATRIURETIC PEPTIDE: CPT

## 2018-05-14 PROCEDURE — 85610 PROTHROMBIN TIME: CPT

## 2018-05-14 PROCEDURE — C8929 TTE W OR WO FOL WCON,DOPPLER: HCPCS

## 2018-05-14 PROCEDURE — 36415 COLL VENOUS BLD VENIPUNCTURE: CPT

## 2018-05-14 PROCEDURE — 71045 X-RAY EXAM CHEST 1 VIEW: CPT

## 2018-05-14 PROCEDURE — 93306 TTE W/DOPPLER COMPLETE: CPT

## 2018-05-14 PROCEDURE — 93970 EXTREMITY STUDY: CPT

## 2018-05-14 PROCEDURE — 83036 HEMOGLOBIN GLYCOSYLATED A1C: CPT

## 2018-05-14 PROCEDURE — 84443 ASSAY THYROID STIM HORMONE: CPT

## 2018-05-14 PROCEDURE — 85730 THROMBOPLASTIN TIME PARTIAL: CPT

## 2018-05-14 PROCEDURE — 84484 ASSAY OF TROPONIN QUANT: CPT

## 2018-05-14 PROCEDURE — 82553 CREATINE MB FRACTION: CPT

## 2018-05-14 PROCEDURE — 85025 COMPLETE CBC W/AUTO DIFF WBC: CPT

## 2018-05-14 PROCEDURE — 99285 EMERGENCY DEPT VISIT HI MDM: CPT

## 2018-05-14 PROCEDURE — 82550 ASSAY OF CK (CPK): CPT

## 2018-05-14 PROCEDURE — 83735 ASSAY OF MAGNESIUM: CPT

## 2018-05-14 PROCEDURE — 93005 ELECTROCARDIOGRAM TRACING: CPT

## 2018-05-14 PROCEDURE — 80053 COMPREHEN METABOLIC PANEL: CPT

## 2018-05-14 PROCEDURE — 85379 FIBRIN DEGRADATION QUANT: CPT

## 2018-05-14 PROCEDURE — 80061 LIPID PANEL: CPT

## 2018-05-14 PROCEDURE — 86140 C-REACTIVE PROTEIN: CPT

## 2018-05-14 PROCEDURE — 80048 BASIC METABOLIC PNL TOTAL CA: CPT

## 2018-05-14 PROCEDURE — 71275 CT ANGIOGRAPHY CHEST: CPT

## 2018-05-14 PROCEDURE — 83690 ASSAY OF LIPASE: CPT

## 2018-05-14 PROCEDURE — 83605 ASSAY OF LACTIC ACID: CPT

## 2018-05-14 RX ADMIN — NYSTATIN SCH APPLIC: 100000 CREAM TOPICAL at 22:16

## 2018-05-14 RX ADMIN — INSULIN LISPRO SCH UNITS: 100 INJECTION, SOLUTION INTRAVENOUS; SUBCUTANEOUS at 21:14

## 2018-05-14 RX ADMIN — ACETAMINOPHEN PRN MG: 325 TABLET ORAL at 21:14

## 2018-05-14 RX ADMIN — ENOXAPARIN SODIUM SCH MG: 40 INJECTION SUBCUTANEOUS at 14:43

## 2018-05-14 RX ADMIN — INSULIN LISPRO SCH UNITS: 100 INJECTION, SOLUTION INTRAVENOUS; SUBCUTANEOUS at 17:18

## 2018-05-14 RX ADMIN — METOPROLOL TARTRATE SCH MG: 25 TABLET, FILM COATED ORAL at 20:49

## 2018-05-14 RX ADMIN — ACETAMINOPHEN PRN MG: 325 TABLET ORAL at 14:43

## 2018-05-14 NOTE — RAD
INDICATION:  Pedal edema, positive d-dimer.



COMPARISON:  Comparison is made with prior study from May 01, 2018.



TECHNIQUE:  Multiple real-time, color flow and Doppler tracings of both lower extremities

were obtained. The exam is limited due to the patient's body habitus.



FINDINGS: The common femoral, femoral, profunda femoral and popliteal veins all

demonstrate normal compressibility, augmentation with compression and phasic response with

respiration.



The posterior tibial veins demonstrate normal compressibility and augmentation with

compression. The peroneal veins were nonvisualized and were not seen on the prior study.



IMPRESSION:  LIMITED STUDY, NO EVIDENCE FOR DEEP VENOUS THROMBOSIS.

## 2018-05-14 NOTE — RAD
Indication: Shortness of breath.



Contrast: Administered 96.3 ml of VISIPAQUE 320 mg/ml



CTA of the chest performed after IV contrast administration. Coronal and sagittal

reconstructed images were obtained.



Inferior thyroid lobes are unremarkable. The study is limited by the patient's body

habitus. No obvious filling defects are noted in the pulmonary artery. Aorta demonstrates

no evidence of aortic dissection or aneurysmal dilatation. There is right paratracheal

lymph nodes measuring up to 12 mm, subcarinal lymph nodes measuring up to 14 mm. Right

hilar adenopathy measuring up to 15 mm is noted. The heart demonstrates no pericardial

effusion.



The trachea and major bronchi appear patent. The lung fields demonstrate no evidence of

pleural fluid, nodules or masses. The study is limited by body habitus.



The abdominal organs demonstrate no gross abnormalities.



IMPRESSION: No pulmonary embolus. No evidence of thoracic aortic dissection.

## 2018-05-14 NOTE — HP
HISTORY AND PHYSICAL:

 

DATE OF ADMISSION:  05/14/18

 

TIME OF ADMISSION:  1:15 p.m.

 

PRIMARY CARE PHYSICIAN:  Dr. Fulton.

 

HISTORY OF PRESENT ILLNESS:  This is a 42-year-old man with history of 
depressed ejection fraction in 2015, who presents today with shortness of 
breath and leg swelling for 3 days.  He first noticed the swelling after he 
completed a 

16-hour shift as a  on Saturday and the shortness of breath has 
progressed over the past 3 days to today when he was unable to walk 10 steps 
without needing to stop and catch his breath.  On a good day, he is able to 
walk up a flight of stairs or city block; however, this change in only being 
able to walk a few steps was alarming to him, so he came to the emergency 
department.  He has had no chest pain over the past 3 days and never gets chest 
pain with exertion.  He was, however, recently treated for UTI, so he admits to 
drinking more fluid than usual because he thought he needed to stay hydrated 
from bladder infection.  He completed his antibiotics without any other events 
and feels his UTI has been adequately resolved.

 

He sleeps on several pillows; however, this is unchanged and he has always 
slept on the same number of pillows.  He does not weigh himself daily, but he 
feels he has been gaining some weight.  He admits to some abdominal bloating 
and distention.

 

PAST MEDICAL HISTORY:  Obstructive sleep apnea, he does not wear CPAP; non-
insulin- dependent diabetes; in 2015, he was admitted and found to have an 
ejection fraction of 35% to 40%, he did not undergo a left heart cath due to 
sepsis at that time and he did not follow up with Cardiology.

 

PAST SURGICAL HISTORY:  None.

 

HOME MEDICATIONS:

1.  Albuterol inhaler q.4 p.r.n. shortness of breath.

2.  Aspirin 81 mg daily.

3.  Glimepiride 8 mg daily.

4.  Lisinopril 10 mg daily.

5.  Metformin 100 mg b.i.d. a.c.

 

ALLERGIES:  No known drug allergies.

 

SOCIAL HISTORY:  He smokes 1 pack per day of cigarettes for the past 25 years.  
He also smokes marijuana.  He does not drink alcohol.  He lives with his wife 
in [place] and works as a .

 

REVIEW OF SYSTEMS:  Positive for a dry cough and some nasal congestion.  He 
denies fevers, chills, nausea, vomiting, diarrhea, or constipation.

 

                               PHYSICAL EXAMINATION

 

GENERAL:  Obese man, in no distress; however, he is mildly tachypneic.

 

VITAL SIGNS:  Blood pressure 111/76; heart rate 84; respiratory rate 23; pulse 
ox 95% on 2 L, I turned his oxygen down to 0 L and his O2 sat was 90%; 
temperature 97.9.

 

HEENT:  Pupils are equal, round, and reactive to light.  No nystagmus.  Oral 
mucosa is moist with no pharyngeal exudates or erythema.

 

NECK:  I am unable to see his jugular veins.

 

LUNGS:  Clear bilaterally.  I hear no wheezes or rhonchi.

 

CHEST:  Regular rate and rhythm.  No murmurs.  I cannot feel his PMI.

 

ABDOMEN:  Obese, mildly tender to palpation diffusely.  He cannot pinpoint the 
pain.  Negative Alston's sign.

 

EXTREMITIES:  3+ lower extremity edema to the knees with _____ chronic venous 
stasis changes bilaterally.

 

 DIAGNOSTIC STUDIES/LAB DATA:  INR is 0.92.  White blood cells 6.6, hemoglobin 
14.5, and platelets 249.  Sodium 137, potassium 3.9, chloride 102, creatinine 
0.61, glucose 290, lactic acid 3.3, magnesium 1.8.  CRP 12.2.  BNP 34.

 

Imaging:  Lower extremity venous Dopplers are negative for DVT.

 

CTA of thorax:  No PE, no thoracic aortic dissection.

 

Chest x-ray:  In the correct clinical setting, chest x-ray could be compatible 
with pulmonary edema.

 

ECG:  Sinus tachycardia, normal axis, normal intervals.  No ST- or T-wave 
changes.

 

ASSESSMENT AND PLAN:  This is a 42-year-old man with history of depressed 
ejection fraction back in 2015, he did not follow up on, who presents today 
with shortness of breath, dyspnea on exertion, and lower extremity edema.

 

1.  Acute on chronic systolic heart failure.  I suspect his decompensation is 
due to recent fluid intake; however, other factors can be contributing 
chronically including nonadherence with CPAP, not being optimized medically in 
the setting of a depressed ejection fraction, and ischemia has not been ruled 
out in the past.  I would like to repeat an echocardiogram since it has been 3 
years, trend his troponins, start him on Lasix, continue his ACE inhibitor, and 
start beta blocker, and consult Cardiology for consideration of an ischemic 
evaluation.  It is concerning that his lactate is elevated in the setting of 
decompensated heart failure; however, he has a normal proportional pulse 
pressure and good mentation, so I am less concerned about the low output state.

2.  Elevated lactate as mentioned in #1.  The biggest concern with this would 
be cardiogenic shock; however, he has good and appropriate mentation.  His LFTs 
are normal and his blood pressure is adequate, so I have a low suspicion for 
low output state.  I also see no signs or localizing symptoms for infection to 
suggest septic shock leading to elevated lactic acid.  Another concern would be 
an abdominal source of elevated lactic acid; however, he has a nonacute 
abdominal exam.  I would like to repeat his lactic acid now.

3.  Sinus tachycardia.  His EKG is read as concern for atrial flutter; however, 
it looks more like sinus tachycardia to me.  I would like to repeat this EKG.

4.  Diabetes.  Hold his home pills and start sliding scale and check A1c for 
risk stratification.

5.  Obstructive sleep apnea.  He does not wear CPAP.  This will be important 
for him going forward.

6.  DVT prophylaxis.  Lovenox subcu.

7.  Code status.  He is full code.  I discussed this with him and he will be 
willing to be intubated should the need arise.

 

[id number] #: [id number]

JESS

## 2018-05-14 NOTE — ED
Cintia HANSEN Thomas, scribed for Getachew Contreras MD on 05/14/18 at 1017 .





Shortness of Breath





- HPI Summary


HPI Summary: 





The patient is a 42 year old male complaining of shortness of breath that began 

two days ago. The shortness of breath is worsened with exertion. He complains 

of bilateral leg swelling. He always has some leg swelling, but he reports that 

his current leg swelling is worse than normal. He complains of pain to his 

thighs. The patient also complains of a cough and some pain when he breathes 

deeply. The patient denies fevers and chest pain. Past medical history includes 

DM. 





- History of Current Complaint


Chief Complaint: EDShortnessOfBreath


Time Seen by Provider: 05/14/18 10:07


Hx Obtained From: Patient


Onset/Duration: Lasting Days, Still Present


Timing: Constant


Current Severity: Moderate


Dyspnea At: Rest


Aggrevating Factors: Deep Breaths


Alleviating Factors: Nothing


Associated Signs & Symptoms: Cough (Nonproductive)


Related History: Obesity





- Allergy/Home Medications


Allergies/Adverse Reactions: 


 Allergies











Allergy/AdvReac Type Severity Reaction Status Date / Time


 


[place] Allergy Severe Swelling Uncoded 05/14/18 09:49














PMH/Surg Hx/FS Hx/Imm Hx


Endocrine/Hematology History: Reports: Hx Anticoagulant Therapy, Hx Diabetes - 

DM x 3 years.  On Rx, FBS ,


   Denies: Hx Thyroid Disease


Cardiovascular History: 


   Denies: Hx Congestive Heart Failure, Hx Deep Vein Thrombosis, Hx Hypertension

, Hx Myocardial Infarction, Hx Pacemaker/ICD


Respiratory History: Reports: Hx Sleep Apnea


   Denies: Hx Asthma, Hx Chronic Obstructive Pulmonary Disease (COPD), Hx Lung 

Cancer


GI History: 


   Denies: Hx Gall Bladder Disease, Hx Gastrointestinal Bleed, Hx Ulcer, Hx 

Urosepsis


 History: 


   Denies: Hx Kidney Stones, Hx Renal Disease


Musculoskeletal History: Reports: Hx Back Problems


Neurological History: 


   Denies: Hx Dementia, Hx Migraine, Hx Seizures, Hx Transient Ischemic Attacks 

(TIA)


Psychiatric History: Reports: Hx Depression


   Denies: Hx Anxiety, Hx Schizophrenia, Hx Bipolar Disorder





- Immunization History


Date of Tetanus Vaccine: utd


Date of Influenza Vaccine: none


Infectious Disease History: No


Infectious Disease History: 


   Denies: Hx Hepatitis, Hx Human Immunodeficiency Virus (HIV), Traveled 

Outside the [place] in Last 30 Days





- Family History


Known Family History: Positive: Cardiac Disease, Hypertension, Diabetes, 

Respiratory Disease, Seizure Disorder





- Social History


Alcohol Use: Rare


Substance Use Type: Reports: Marijuana


Substance Use Comment - Amount & Last Used: 5/19/17


Hx Tobacco Use: Yes


Smoking Status (MU): Current Every Day Smoker


Type: Cigarettes


Amount Used/How Often: 1 PPD





Review of Systems


Negative: Fever


Negative: Chest Pain


Positive: Shortness Of Breath


Negative: Other - anorexia


Positive: Edema, Other - Pain to thighs


All Other Systems Reviewed And Are Negative: Yes





Physical Exam





- Summary


Physical Exam Summary: 





General: well-appearing, no pain distress


Skin: warm, color reflects adequate perfusion, dry


Head: normal


Eyes: EOMI, EMILIANO


ENT: normal


Neck: supple, nontender


Respiratory: CTA, breath sounds present


Cardiovascular: Tachycardia, regular rhythm. 


Abdomen: soft, nontender


Bowel: present


Musculoskeletal: strength/ROM intact, bilateral edema


Neurological: normal, sensory/motor intact, A&O x3


Psychological: affect/mood appropriate


Triage Information Reviewed: Yes


Vital Signs On Initial Exam: 


 Initial Vitals











Temp Pulse Resp BP Pulse Ox


 


 97.9 F   101   24   133/68   90 


 


 05/14/18 09:42  05/14/18 09:42  05/14/18 09:42  05/14/18 09:42  05/14/18 09:42











Vital Signs Reviewed: Yes





Diagnostics





- Vital Signs


 Vital Signs











  Temp Pulse Resp BP Pulse Ox


 


 05/14/18 09:42  97.9 F  101  24  133/68  90














- Laboratory


Lab Results: 


 Lab Results











  05/14/18 05/14/18 05/14/18 Range/Units





  10:21 10:21 10:21 


 


WBC  6.6    (3.5-10.8)  10^3/ul


 


RBC  4.91    (4.0-5.4)  10^6/ul


 


Hgb  14.5    (14.0-18.0)  g/dl


 


Hct  42    (42-52)  %


 


MCV  86    (80-94)  fL


 


MCH  29    (27-31)  pg


 


MCHC  34    (31-36)  g/dl


 


RDW  15    (10.5-15)  %


 


Plt Count  249    (150-450)  10^3/ul


 


MPV  7.3 L    (7.4-10.4)  um3


 


Neut % (Auto)  48.8    (38-83)  %


 


Lymph % (Auto)  43.0    (25-47)  %


 


Mono % (Auto)  5.9    (0-7)  %


 


Eos % (Auto)  1.2    (0-6)  %


 


Baso % (Auto)  1.1    (0-2)  %


 


Absolute Neuts (auto)  3.2    (1.5-7.7)  10^3/ul


 


Absolute Lymphs (auto)  2.8    (1.0-4.8)  10^3/ul


 


Absolute Monos (auto)  0.4    (0-0.8)  10^3/ul


 


Absolute Eos (auto)  0.1    (0-0.6)  10^3/ul


 


Absolute Basos (auto)  0.1    (0-0.2)  10^3/ul


 


Absolute Nucleated RBC  0    10^3/ul


 


Nucleated RBC %  0.2    


 


INR (Anticoag Therapy)   0.92   (0.77-1.02)  


 


APTT   34.2   (26.0-36.3)  seconds


 


D-Dimer, Quantitative   643 H   (Less Than 230)  ng/mL


 


Sodium    137 L  (139-145)  mmol/L


 


Potassium    3.9  (3.5-5.0)  mmol/L


 


Chloride    102  (101-111)  mmol/L


 


Carbon Dioxide    27  (22-32)  mmol/L


 


Anion Gap    8  (2-11)  mmol/L


 


BUN    8  (6-24)  mg/dL


 


Creatinine    0.61 L  (0.67-1.17)  mg/dL


 


Est GFR ( Amer)    185.5  (>60)  


 


Est GFR (Non-Af Amer)    144.3  (>60)  


 


BUN/Creatinine Ratio    13.1  (8-20)  


 


Glucose    290 H  ()  mg/dL


 


Lactic Acid     (0.5-2.0)  mmol/L


 


Calcium    8.8  (8.6-10.3)  mg/dL


 


Magnesium    1.8 L  (1.9-2.7)  mg/dL


 


Total Bilirubin    0.70  (0.2-1.0)  mg/dL


 


AST    29  (13-39)  U/L


 


ALT    36  (7-52)  U/L


 


Alkaline Phosphatase    56  ()  U/L


 


Total Creatine Kinase    36  ()  U/L


 


CK-MB (CK-2)    1.5  (0.6-6.3)  ng/mL


 


Troponin I    0.00  (<0.04)  ng/mL


 


C-Reactive Protein    12.23 H  (< 5.00)  mg/L


 


B-Natriuretic Peptide    ( - 100) pg/mL


 


Total Protein    6.3 L  (6.4-8.9)  g/dL


 


Albumin    3.4  (3.2-5.2)  g/dL


 


Globulin    2.9  (2-4)  g/dL


 


Albumin/Globulin Ratio    1.2  (1-3)  


 


Lipase    52  (11.0-82.0)  U/L


 


TSH    0.74  (0.34-5.60)  mcIU/mL














  05/14/18 05/14/18 Range/Units





  10:21 10:21 


 


WBC    (3.5-10.8)  10^3/ul


 


RBC    (4.0-5.4)  10^6/ul


 


Hgb    (14.0-18.0)  g/dl


 


Hct    (42-52)  %


 


MCV    (80-94)  fL


 


MCH    (27-31)  pg


 


MCHC    (31-36)  g/dl


 


RDW    (10.5-15)  %


 


Plt Count    (150-450)  10^3/ul


 


MPV    (7.4-10.4)  um3


 


Neut % (Auto)    (38-83)  %


 


Lymph % (Auto)    (25-47)  %


 


Mono % (Auto)    (0-7)  %


 


Eos % (Auto)    (0-6)  %


 


Baso % (Auto)    (0-2)  %


 


Absolute Neuts (auto)    (1.5-7.7)  10^3/ul


 


Absolute Lymphs (auto)    (1.0-4.8)  10^3/ul


 


Absolute Monos (auto)    (0-0.8)  10^3/ul


 


Absolute Eos (auto)    (0-0.6)  10^3/ul


 


Absolute Basos (auto)    (0-0.2)  10^3/ul


 


Absolute Nucleated RBC    10^3/ul


 


Nucleated RBC %    


 


INR (Anticoag Therapy)    (0.77-1.02)  


 


APTT    (26.0-36.3)  seconds


 


D-Dimer, Quantitative    (Less Than 230)  ng/mL


 


Sodium    (139-145)  mmol/L


 


Potassium    (3.5-5.0)  mmol/L


 


Chloride    (101-111)  mmol/L


 


Carbon Dioxide    (22-32)  mmol/L


 


Anion Gap    (2-11)  mmol/L


 


BUN    (6-24)  mg/dL


 


Creatinine    (0.67-1.17)  mg/dL


 


Est GFR ( Amer)    (>60)  


 


Est GFR (Non-Af Amer)    (>60)  


 


BUN/Creatinine Ratio    (8-20)  


 


Glucose    ()  mg/dL


 


Lactic Acid  3.3 H*   (0.5-2.0)  mmol/L


 


Calcium    (8.6-10.3)  mg/dL


 


Magnesium    (1.9-2.7)  mg/dL


 


Total Bilirubin    (0.2-1.0)  mg/dL


 


AST    (13-39)  U/L


 


ALT    (7-52)  U/L


 


Alkaline Phosphatase    ()  U/L


 


Total Creatine Kinase    ()  U/L


 


CK-MB (CK-2)    (0.6-6.3)  ng/mL


 


Troponin I    (<0.04)  ng/mL


 


C-Reactive Protein    (< 5.00)  mg/L


 


B-Natriuretic Peptide   34 ( - 100) pg/mL


 


Total Protein    (6.4-8.9)  g/dL


 


Albumin    (3.2-5.2)  g/dL


 


Globulin    (2-4)  g/dL


 


Albumin/Globulin Ratio    (1-3)  


 


Lipase    (11.0-82.0)  U/L


 


TSH    (0.34-5.60)  mcIU/mL











Result Diagrams: 


 05/14/18 10:21





 05/14/18 10:21


Lab Statement: Any lab studies that have been ordered have been reviewed, and 

results considered in the medical decision making process.





- Radiology


  ** CXR


Xray Interpretation: Positive (See Comments) - IMPRESSION:  In the correct 

clinical setting chest x-ray findings could be compatible with pulmonary edema. 

Dr. Contreras has reviewed this report.


Radiology Interpretation Completed By: Radiologist





- CT


  ** CTA Chest


CT Interpretation: No Acute Changes -   IMPRESSION: No pulmonary embolus. No 

evidence of thoracic aortic dissection. Dr. Contreras has reviewed this report.


CT Interpretation Completed By: Radiologist





- EKG


  ** 09:57


Cardiac Rate: Tachycardia


EKG Rhythm: Sinus Tachycardia - at 106 BPM


EKG Interpretation: Low voltage in precordial leads. Normal ST. 





- Additional Comments


Diagnostic Additional Comments: 





Ultrasound Lower Extremities


Interpreted by radiologist


IMPRESSION:  LIMITED STUDY, NO EVIDENCE FOR DEEP VENOUS THROMBOSIS.


Dr. Contreras has reviewed this report. 





Course/Dx





- Course


Course Of Treatment: Medications reviewed. Allergies noted.  DISCUSSED RESULTS 

WITH THE PATIENT AND HIS WIFE.  NO IV FLUIDS GIVEN DUE TO THE CONCERN OF CHF.  

DISCUSSED STARTING LASIX WITH THE HOSPITALIST. THE HOSPITALISTS WILL DETERMINE 

NEED FOR LASIX.  ADMIT HOSPITALIST.





- Diagnoses


Provider Diagnoses: 


 Dyspnea, CHF (congestive heart failure)








- Physician Notifications


Discussed Care of Patient With: Arianne Paredes


Time Discussed With Above Provider: 13:29


Instructed by Provider To: Admit As Inpatient





Discharge





- Sign-Out/Discharge


Documenting (check all that apply): Discharge/Admit/Transfer





- Discharge Plan


Condition: Stable


Disposition: ADMITTED TO [facility]


Referrals: 


Kirstin HUTCHINSON,Hari GARCIA [Primary Care Provider] - 





- Billing Disposition and Condition


Condition: STABLE


Disposition: [facility]





The documentation as recorded by the Cintia bey Thomas accurately reflects 

the service I personally performed and the decisions made by me, Getachew Contreras MD.

## 2018-05-14 NOTE — RAD
INDICATION: Shortness of breath and pedal edema



COMPARISON: Similar chest x-ray May 01, 2018

 

TECHNIQUE: Single AP portable view of the chest was obtained.



FINDINGS: 



Image quality is compromised due to the relative inferiority of a portable chest x-ray.



The heart and mediastinum exhibit normal size and contour.



The lungs appear hazy and the vasculature appears mildly engorged and indistinct. There is

no definite costophrenic angle blunting indicating a large pleural effusion. 



Visualized bones are normal for the patient's age.



IMPRESSION:  In the correct clinical setting chest x-ray findings could be compatible with

pulmonary edema.

## 2018-05-15 LAB
BASOPHILS # BLD AUTO: 0 10^3/UL (ref 0–0.2)
EOSINOPHIL # BLD AUTO: 0.1 10^3/UL (ref 0–0.6)
HCT VFR BLD AUTO: 42 % (ref 42–52)
HGB BLD-MCNC: 14 G/DL (ref 14–18)
LYMPHOCYTES # BLD AUTO: 2.6 10^3/UL (ref 1–4.8)
MCH RBC QN AUTO: 29 PG (ref 27–31)
MCHC RBC AUTO-ENTMCNC: 33 G/DL (ref 31–36)
MCV RBC AUTO: 87 FL (ref 80–94)
MONOCYTES # BLD AUTO: 0.5 10^3/UL (ref 0–0.8)
NEUTROPHILS # BLD AUTO: 2.8 10^3/UL (ref 1.5–7.7)
NRBC # BLD AUTO: 0 10^3/UL
NRBC BLD QL AUTO: 0.1
PLATELET # BLD AUTO: 225 10^3/UL (ref 150–450)
RBC # BLD AUTO: 4.84 10^6/UL (ref 4–5.4)
WBC # BLD AUTO: 6.2 10^3/UL (ref 3.5–10.8)

## 2018-05-15 RX ADMIN — METOPROLOL TARTRATE SCH MG: 25 TABLET, FILM COATED ORAL at 09:02

## 2018-05-15 RX ADMIN — FUROSEMIDE SCH MG: 10 INJECTION, SOLUTION INTRAMUSCULAR; INTRAVENOUS at 11:46

## 2018-05-15 RX ADMIN — NYSTATIN SCH APPLIC: 100000 CREAM TOPICAL at 14:08

## 2018-05-15 RX ADMIN — INSULIN LISPRO SCH UNITS: 100 INJECTION, SOLUTION INTRAVENOUS; SUBCUTANEOUS at 17:15

## 2018-05-15 RX ADMIN — ENOXAPARIN SODIUM SCH MG: 40 INJECTION SUBCUTANEOUS at 14:07

## 2018-05-15 RX ADMIN — LISINOPRIL SCH MG: 10 TABLET ORAL at 09:02

## 2018-05-15 RX ADMIN — NYSTATIN SCH APPLIC: 100000 CREAM TOPICAL at 20:41

## 2018-05-15 RX ADMIN — NICOTINE SCH PATCH: 14 PATCH TRANSDERMAL at 12:55

## 2018-05-15 RX ADMIN — METOPROLOL TARTRATE SCH MG: 25 TABLET, FILM COATED ORAL at 20:39

## 2018-05-15 RX ADMIN — ASPIRIN SCH MG: 81 TABLET, CHEWABLE ORAL at 09:02

## 2018-05-15 RX ADMIN — INSULIN LISPRO SCH UNITS: 100 INJECTION, SOLUTION INTRAVENOUS; SUBCUTANEOUS at 09:01

## 2018-05-15 RX ADMIN — NYSTATIN SCH APPLIC: 100000 CREAM TOPICAL at 10:40

## 2018-05-15 RX ADMIN — INSULIN LISPRO SCH UNITS: 100 INJECTION, SOLUTION INTRAVENOUS; SUBCUTANEOUS at 12:55

## 2018-05-15 RX ADMIN — FUROSEMIDE SCH MG: 10 INJECTION, SOLUTION INTRAMUSCULAR; INTRAVENOUS at 17:15

## 2018-05-15 RX ADMIN — ACETAMINOPHEN PRN MG: 325 TABLET ORAL at 09:05

## 2018-05-15 RX ADMIN — INSULIN LISPRO SCH UNITS: 100 INJECTION, SOLUTION INTRAVENOUS; SUBCUTANEOUS at 20:38

## 2018-05-15 RX ADMIN — ACETAMINOPHEN PRN MG: 325 TABLET ORAL at 03:08

## 2018-05-15 NOTE — PN
Subjective


Date of Service: 05/15/18


Interval History: 





No overnight events, feels much better this mrashley.  He has been walking to 

the bathroom without needing to stop and catch his breath.  His legs feel sore 

this morning, described as tight skin.  No chest pain, palpitations, nausea, 

vomiting. 


Social History: Unchanged from Admission


Past Medical History: Unchanged from Admission





Objective


Active Medications: 








Acetaminophen (Tylenol Tab*)  650 mg PO Q6H PRN


   PRN Reason: PAIN


   Last Admin: 05/15/18 03:08 Dose:  650 mg


Albuterol (Ventolin Hfa Inhaler*)  2 puff INH Q4HR PRN


   PRN Reason: SHORTNESS OF BREATH


Aspirin (Aspirin 81 Mg Chew Tab*)  81 mg PO DAILY OSCAR


Device (Nicotine Mouth Piece*)  1 each INH .USE WITH NICOTROL PRN


   PRN Reason: CRAVING


Dextrose (D50w Syringe 50 Ml*)  12.5 gm IV PUSH .FOR FS < 60 - SS PRN


   PRN Reason: FS < 60


Enoxaparin Sodium (Lovenox(*))  40 mg SUBCUT Q24H [facility]


   Last Admin: 05/14/18 14:43 Dose:  40 mg


Insulin Human Lispro (Humalog*)  0 units SUBCUT ACHS [facility]


   PRN Reason: Protocol


   Last Admin: 05/14/18 21:14 Dose:  6 units


Lisinopril (Prinivil Tab*)  10 mg PO DAILY [facility]


Metoprolol Tartrate (Lopressor Tab*)  12.5 mg PO Q12HR [facility]


   Last Admin: 05/14/18 20:49 Dose:  12.5 mg


Nicotine (Nicotine Inhaler*)  10 mg INH Q2H PRN


   PRN Reason: CRAVING


Nystatin (Nystatin Cream*)  1 applic TOPICAL TID [facility]


   Last Admin: 05/14/18 22:16 Dose:  1 applic








 Vital Signs - 8 hr











  05/14/18 05/15/18 05/15/18





  23:41 04:00 04:25


 


Temperature 97.7 F 97.7 F 


 


Pulse Rate 79 87 


 


Respiratory 16 16 18





Rate   


 


Blood Pressure 138/83 107/94 





(mmHg)   


 


O2 Sat by Pulse 93 95 





Oximetry   











Oxygen Devices in Use Now: Nasal Cannula


Appearance: alert, sitting up in bed, no distress, speaking in full sentences


Eyes: No Scleral Icterus


Ears/Nose/Mouth/Throat: NL Teeth, Lips, Gums


Neck: - - cannot see his jugular vein 


Respiratory: - - decreased breath sounds at the bases


Cardiovascular: RRR


Abdominal: - - obese, liver nonpalpable, no guarding or rebound


Lymphatic: No Cervical Adenopathy


Extremities: - - venous stasis changes b/l, 3+ edema to knees 


Result Diagrams: 


 05/15/18 04:50





 05/15/18 05:08


Additional Lab and Data: 


 Lab Results











  05/14/18 05/14/18 05/14/18 Range/Units





  10:21 10:21 10:21 


 


WBC  6.6    (3.5-10.8)  10^3/ul


 


RBC  4.91    (4.0-5.4)  10^6/ul


 


Hgb  14.5    (14.0-18.0)  g/dl


 


Hct  42    (42-52)  %


 


MCV  86    (80-94)  fL


 


MCH  29    (27-31)  pg


 


MCHC  34    (31-36)  g/dl


 


RDW  15    (10.5-15)  %


 


Plt Count  249    (150-450)  10^3/ul


 


MPV  7.3 L    (7.4-10.4)  um3


 


Neut % (Auto)  48.8    (38-83)  %


 


Lymph % (Auto)  43.0    (25-47)  %


 


Mono % (Auto)  5.9    (0-7)  %


 


Eos % (Auto)  1.2    (0-6)  %


 


Baso % (Auto)  1.1    (0-2)  %


 


Absolute Neuts (auto)  3.2    (1.5-7.7)  10^3/ul


 


Absolute Lymphs (auto)  2.8    (1.0-4.8)  10^3/ul


 


Absolute Monos (auto)  0.4    (0-0.8)  10^3/ul


 


Absolute Eos (auto)  0.1    (0-0.6)  10^3/ul


 


Absolute Basos (auto)  0.1    (0-0.2)  10^3/ul


 


Absolute Nucleated RBC  0    10^3/ul


 


Nucleated RBC %  0.2    


 


INR (Anticoag Therapy)   0.92   (0.77-1.02)  


 


APTT   34.2   (26.0-36.3)  seconds


 


D-Dimer, Quantitative   643 H   (Less Than 230)  ng/mL


 


Sodium    137 L  (139-145)  mmol/L


 


Potassium    3.9  (3.5-5.0)  mmol/L


 


Chloride    102  (101-111)  mmol/L


 


Carbon Dioxide    27  (22-32)  mmol/L


 


Anion Gap    8  (2-11)  mmol/L


 


BUN    8  (6-24)  mg/dL


 


Creatinine    0.61 L  (0.67-1.17)  mg/dL


 


Est GFR ( Amer)    185.5  (>60)  


 


Est GFR (Non-Af Amer)    144.3  (>60)  


 


BUN/Creatinine Ratio    13.1  (8-20)  


 


Glucose    290 H  ()  mg/dL


 


Lactic Acid     (0.5-2.0)  mmol/L


 


Calcium    8.8  (8.6-10.3)  mg/dL


 


Magnesium    1.8 L  (1.9-2.7)  mg/dL


 


Total Bilirubin    0.70  (0.2-1.0)  mg/dL


 


AST    29  (13-39)  U/L


 


ALT    36  (7-52)  U/L


 


Alkaline Phosphatase    56  ()  U/L


 


Total Creatine Kinase    36  ()  U/L


 


CK-MB (CK-2)    1.5  (0.6-6.3)  ng/mL


 


Troponin I    0.00  (<0.04)  ng/mL


 


C-Reactive Protein    12.23 H  (< 5.00)  mg/L


 


B-Natriuretic Peptide    ( - 100) pg/mL


 


Total Protein    6.3 L  (6.4-8.9)  g/dL


 


Albumin    3.4  (3.2-5.2)  g/dL


 


Globulin    2.9  (2-4)  g/dL


 


Albumin/Globulin Ratio    1.2  (1-3)  


 


Lipase    52  (11.0-82.0)  U/L


 


TSH    0.74  (0.34-5.60)  mcIU/mL














  05/14/18 05/14/18 Range/Units





  10:21 10:21 


 


WBC    (3.5-10.8)  10^3/ul


 


RBC    (4.0-5.4)  10^6/ul


 


Hgb    (14.0-18.0)  g/dl


 


Hct    (42-52)  %


 


MCV    (80-94)  fL


 


MCH    (27-31)  pg


 


MCHC    (31-36)  g/dl


 


RDW    (10.5-15)  %


 


Plt Count    (150-450)  10^3/ul


 


MPV    (7.4-10.4)  um3


 


Neut % (Auto)    (38-83)  %


 


Lymph % (Auto)    (25-47)  %


 


Mono % (Auto)    (0-7)  %


 


Eos % (Auto)    (0-6)  %


 


Baso % (Auto)    (0-2)  %


 


Absolute Neuts (auto)    (1.5-7.7)  10^3/ul


 


Absolute Lymphs (auto)    (1.0-4.8)  10^3/ul


 


Absolute Monos (auto)    (0-0.8)  10^3/ul


 


Absolute Eos (auto)    (0-0.6)  10^3/ul


 


Absolute Basos (auto)    (0-0.2)  10^3/ul


 


Absolute Nucleated RBC    10^3/ul


 


Nucleated RBC %    


 


INR (Anticoag Therapy)    (0.77-1.02)  


 


APTT    (26.0-36.3)  seconds


 


D-Dimer, Quantitative    (Less Than 230)  ng/mL


 


Sodium    (139-145)  mmol/L


 


Potassium    (3.5-5.0)  mmol/L


 


Chloride    (101-111)  mmol/L


 


Carbon Dioxide    (22-32)  mmol/L


 


Anion Gap    (2-11)  mmol/L


 


BUN    (6-24)  mg/dL


 


Creatinine    (0.67-1.17)  mg/dL


 


Est GFR ( Amer)    (>60)  


 


Est GFR (Non-Af Amer)    (>60)  


 


BUN/Creatinine Ratio    (8-20)  


 


Glucose    ()  mg/dL


 


Lactic Acid  3.3 H*   (0.5-2.0)  mmol/L


 


Calcium    (8.6-10.3)  mg/dL


 


Magnesium    (1.9-2.7)  mg/dL


 


Total Bilirubin    (0.2-1.0)  mg/dL


 


AST    (13-39)  U/L


 


ALT    (7-52)  U/L


 


Alkaline Phosphatase    ()  U/L


 


Total Creatine Kinase    ()  U/L


 


CK-MB (CK-2)    (0.6-6.3)  ng/mL


 


Troponin I    (<0.04)  ng/mL


 


C-Reactive Protein    (< 5.00)  mg/L


 


B-Natriuretic Peptide   34 ( - 100) pg/mL


 


Total Protein    (6.4-8.9)  g/dL


 


Albumin    (3.2-5.2)  g/dL


 


Globulin    (2-4)  g/dL


 


Albumin/Globulin Ratio    (1-3)  


 


Lipase    (11.0-82.0)  U/L


 


TSH    (0.34-5.60)  mcIU/mL














Assess/Plan/Problems-Billing


Assessment: 





41 yo man with history of systolic heart failure and morbid obesity admitted 

with shortness of breath, dyspnea on exertion, and 15 pound weight gain in 2 

weeks. 





- Patient Problems


(1) Acute on chronic systolic (congestive) heart failure


Current Visit: Yes   Status: Acute   Code(s): I50.23 - ACUTE ON CHRONIC 

SYSTOLIC (CONGESTIVE) HEART FAILURE   SNOMED Code(s): 070593344


   Comment: Likely due to recent excess fluid intake in setting of UTI 


Patient reports 15 lbs up from baseline ("dry weight" is 460lbs) 


Down 2lbs since yesterday, continue daily weights and strict In/Out 


Etiology is unclear.  I discussed the case with cardiology yesterday.  

Unfortunately a LHC cannot be performed due to his weight and limitations of 

the cath table.  I also talked with nuclear medicine--the stress test table 

cannot accommodate a weight over 440lbs. 


The treatment at this time will be medical management 


I added a beta blocker to his home ACe inhibitor yesterday 


His cholesterol/LDL are okay, but given his diabetes and inability to evaluate 

for underlying ischemia, a statin is indicated


   





(2) BMI 60.0-69.9, adult


Current Visit: Yes   Status: Acute   Code(s): Z68.44 - BODY MASS INDEX (BMI) 

60.0-69.9, ADULT   SNOMED Code(s): 698684638


   





(3) GILLES (obstructive sleep apnea)


Current Visit: Yes   Status: Acute   Code(s): G47.33 - OBSTRUCTIVE SLEEP APNEA (

ADULT) (PEDIATRIC)   SNOMED Code(s): 32780675


   Comment: does not wear cpap


this may also be contributing to heart failure    





(4) Diabetes


Current Visit: Yes   Status: Acute   Code(s): E11.9 - TYPE 2 DIABETES MELLITUS 

WITHOUT COMPLICATIONS   SNOMED Code(s): 01621360


   Comment: A1c pending


continue lispro sliding scale for now

## 2018-05-15 NOTE — ECHO
Patient:      STEVEN PAULA Rec#:     [id number]            :          1975          

Date:         05/15/2018            Age:          42y                 

Account#:     [id number]          Height:       175.26 cm / 69.0 in

Accession#:   [id number]           Weight:       214.55 kg / 472.9 lbs

Sex:          M                     BSA:          2.98

Room#:        [facility]                 

Admit Date#:  2018          

Type:         Inpatient

 

Referring:    Arianne Paredes MD

Reading:      Chris Salgado MD

[facility]  [facility]

______________________________________________________________________

 

Transthoracic Echocardiogram

 

Indication:

CHF, dyspnea

BP:           107/94

HR:           81

Rhythm:       NSR

 

Findings     

History:

Morbid obesity, smoker, DM, GILLES. 

 

Technical Comments:

The study is technically difficult.  The study is technically limited

due to patient body habitus. Completed at 0904. 

 

Left Ventricle:

The left ventricular chamber size is normal. Moderate concentric left

ventricular hypertrophy is observed. There is septal flattening of the

interventricular septum consistent with right ventricular volume or

pressure overload.  Normal left ventricular diastolic filling is

observed. 

 

Left Atrium:

The left atrium is mildly dilated. 

 

Right Ventricle:

The right ventricle is moderately dilated.  The right ventricular global

systolic function is mildly to moderately reduced. 

 

Right Atrium:

The right atrium is moderately dilated.  

 

Aortic Valve:

The aortic valve is trileaflet. The aortic valve leaflets are mildly

thickened. There is no evidence of aortic regurgitation. There is no

evidence of aortic stenosis. 

 

Mitral Valve:

The mitral valve leaflets are mildly thickened. There is a trace of

mitral regurgitation. There is no evidence of mitral stenosis. 

 

Tricuspid Valve:

The tricuspid valve structure is not well visualized. There is trace

tricuspid regurgitation.  Unable to estimate the right ventricular

systolic pressure.   There is no tricuspid stenosis. 

 

Pulmonic Valve:

The pulmonic valve structure is not well visualized. There is a trace

pulmonic regurgitation.  There is no pulmonic stenosis.  

 

Pericardium:

There is no significant pericardial effusion. A pericardial fat pad is

visualized. 

 

Aorta:

There is mild dilatation of the ascending aorta. There is mild

dilatation of the aortic arch. There is mild dilatation of the aortic

root. 

 

Pulmonary Artery:

The main pulmonary artery is not well visualized. 

 

Venous:

The inferior vena cava is dilated.  There is a greater than 50%

respiratory change in the inferior vena cava dimension. 

 

Contrast:

Definity was used to optimize study.  2 mL of diluted Definity was

utilized.  Intravenous contrast was used to enhance endocardial border

definition. 

 

Conclusions

Likely normal LV function but poor LV visualization even with contrast

EF Greater Than 50%

 

There is no evidence of aortic stenosis.

There is no evidence of mitral stenosis.

There is trace tricuspid regurgitation. 

Unable to estimate the right ventricular systolic pressure.  

There is no significant pericardial effusion.

Not able to compare to ALEXANDRA of 2104

 

Measurements     

Name                    Value         Normal Range            

RVIDd (AP) 2D           4.2 cm        (0.9 - 2.6)             

RVDdMajor (2D)          5.2 cm        (2.2 - 4.4)             

RAd ISD 4CH             5.9 cm        (3.4 - 4.9)             

RA (A4C)W               4.8 cm        (2.9 - 4.6)             

IVSd (2D)               1.5 cm        (0.6 - 1)               

LVPWd (2D)              1.4 cm        (0.6 - 1)               

LVIDd (2D)              4 cm          (3.6 - 5.4)             

LVIDs (2D)              3.1 cm        -                        

LV FS (2D)              22 %          (25 - 45)               

Aortic Annulus          2.4 cm        (1.4 - 2.6)             

Ao root diameter (2D)   3.7 cm        (2.1 - 3.5)             

Ascending Ao            3.9 cm        (2.1 - 3.4)             

Aortic arch             3.5 cm        (1.8 - 3.4)             

LA dimension (AP) 2D    4.3 cm        (2.3 - 3.8)             

LAd ISD 4CH             6.2 cm        (2.9 - 5.3)             

LA ISD 4CH W            5.1 cm        (2.5 - 4.5)             

 

Name                    Value         Normal Range            

LA ESV SP 4CH (A/L)     127 ml        -                        

LA ESV SP 2CH (A/L)     99 ml         -                        

LA ESV BP (A/L)         117 ml        -                        

LA ESV BP (A/L) index   39 ml/m2      -                        

LA ESV SP 4CH (MOD)     112 ml        -                        

LA ESV SP 2CH (MOD)     89 ml         -                        

 

Name                    Value         Normal Range            

MV E-wave Vmax          0.97 m/sec    -                        

MV deceleration time    213 msec      -                        

MV A-wave Vmax          0.56 m/sec    -                        

MV E:A ratio            1.71 ratio    -                        

LV septal e' Vmax       0.13 m/sec    -                        

LV lateral e' Vmax      0.16 m/sec    -                        

LV E:e' septal ratio    7.46 ratio    -                        

LV E:e' lateral ratio   6.06 ratio    -                        

 

Name                    Value         Normal Range            

AV Vmax                 1.3 m/sec     -                        

AV VTI                  29.86 cm      -                        

AV peak gradient        6.38 mmHg     -                        

AV mean gradient        3.19 mmHg     -                        

LVOT Vmax               0.95 m/sec    -                        

LVOT VTI                18.67 cm      -                        

LVOT peak gradient      3.62 mmHg     -                        

LVOT mean gradient      1.78 mmHg     -                        

ANN MARIE Vmax                0.68 m/sec    -                        

 

Name                    Value         Normal Range            

IVC diameter            2.13 cm       -                        

 

Name                    Value         Normal Range            

PV Vmax                 0.95 m/sec    -                        

PV peak gradient        3.6 mmHg      -                        

 

Electronically signed by: Chris Salgado MD on 05/15/2018 12:09:04

## 2018-05-16 VITALS — DIASTOLIC BLOOD PRESSURE: 65 MMHG | SYSTOLIC BLOOD PRESSURE: 117 MMHG

## 2018-05-16 RX ADMIN — METOPROLOL TARTRATE SCH MG: 25 TABLET, FILM COATED ORAL at 09:23

## 2018-05-16 RX ADMIN — ACETAMINOPHEN PRN MG: 325 TABLET ORAL at 09:31

## 2018-05-16 RX ADMIN — INSULIN LISPRO SCH UNITS: 100 INJECTION, SOLUTION INTRAVENOUS; SUBCUTANEOUS at 09:23

## 2018-05-16 RX ADMIN — NYSTATIN SCH: 100000 CREAM TOPICAL at 11:05

## 2018-05-16 RX ADMIN — NICOTINE SCH PATCH: 14 PATCH TRANSDERMAL at 09:25

## 2018-05-16 RX ADMIN — FUROSEMIDE SCH MG: 10 INJECTION, SOLUTION INTRAMUSCULAR; INTRAVENOUS at 09:24

## 2018-05-16 RX ADMIN — LISINOPRIL SCH MG: 10 TABLET ORAL at 09:24

## 2018-05-16 RX ADMIN — INSULIN LISPRO SCH UNITS: 100 INJECTION, SOLUTION INTRAVENOUS; SUBCUTANEOUS at 12:35

## 2018-05-16 RX ADMIN — ASPIRIN SCH MG: 81 TABLET, CHEWABLE ORAL at 09:24

## 2018-05-17 NOTE — DS
DISCHARGE SUMMARY:

 

DATE OF ADMISSION:  05/14/18

 

DATE OF DISCHARGE:  05/16/18

 

ADMITTING PROVIDER:  Arianne Paredes DO.

 

ATTENDING PHYSICIAN:  Porfirio Kennedy MD

 

PRIMARY CARE PROVIDER:  Dr. Fulton.

 

CHIEF COMPLAINT:  Shortness of breath and leg swelling.

 

PRINCIPAL DIAGNOSIS:  Acute congestive heart failure exacerbation.

 

HISTORY OF PRESENT ILLNESS AND HOSPITAL COURSE:  Elijah Motta is a 42-year-
old male with a past medical history of systolic CHF back in 2015, with 
ejection fraction of 35% to 40% at that time; current 1 pack per day smoker; 
poorly control non-insulin dependent diabetes mellitus.  Please see H and P of 
Dr. Arianne Paredes for full details.  He noticed progressive shortness of 
breath and leg swelling for 3 days, especially after completing a 16-hour shift 
where he works as a , recently, able to walk a few steps before 
getting dyspneic.  He denied any chest pain.  He recently had been treated for 
a urinary tract infection after presentation to the emergency room, got 5 days 
of ciprofloxacin.  He had also been evaluated in the emergency room for some 
intermittent right-sided flank pain, back pain, chest pain, and leg pain on May 
1st, but left AMA, before admission, at that time.  He had notable lower 
extremity edema, was thought to have acute CHF exacerbation.  He was put on 
Lasix 40 mg IV b.i.d.  He had a repeat echocardiogram which showed improvement 
in his ejection fraction to greater than 50%.  It was a poor study with poor 
visualization.  There was no evidence of aortic stenosis, mitral stenosis.  
There was trace tricuspid regurgitation.  RVSP was unable to be estimated.  He 
notably had gained 15 pounds between his PCP appointment with Dr. Fulton and 
the admission.  He lost 2.5 kg between admission and discharge. Weight on 
discharge is 212.0 kg versus 215.5 kg.  His reported weight as an outpatient 
had been 460 pounds.  He required 3 liters of oxygen, was able to be weaned off 
by the day of discharge, able to ambulate the halls.  He had a 10-year ASCVD 
risk of 10.2%, was recommended to start on high-intensity statin.  Given his 
prescription for atorvastatin 40 mg daily, he was also started on beta-blocker, 
continued on his ACE inhibitor, and aspirin.  Strong consideration to establish 
care with a cardiologist.  He has already been plugged in with [facility] for trying 
to get a sleep study done and [facility] to try to loose 
weight. His BMI is 69.  He did not want referral to Dr. Self for bariatric 
surgery consultation at this point in time prior to his attempts at lifestyle 
modification. He is interested in getting nicotine patch and inhaler 
supplementation to help quit smoking at this time, and this was provided.  He 
was without chest pain throughout this admission.  He also had a CT angiogram 
which showed no pulmonary embolus, no evidence of thoracic aortic dissection.  
He had negative duplex Doppler of the lower extremity without evidence of DVTs, 
although it was a limited study.  His A1c was checked, it was 9.5.

 

DISCHARGE MEDICATIONS:  Include:

1.  Atorvastatin 40 mg daily (new).

2.  Lasix 20 mg daily (new).

3.  Albuterol 2 puffs inhaled q.4 hours p.r.n.

4.  Aspirin 81 mg.

5.  Lisinopril 10 mg daily.

6.  Metoprolol succinate 25 mg daily (new).

7.  Nicotine inhaler 10 mg inhaled q.2 hours p.r.n. (new).

8.  Nicotine patch 14 mg per 24 hours (new).

9.  Metformin 1000 mg p.o. b.i.d.

10.  Glimepiride 8 mg p.o. q.a.m.

 

DISCHARGE DIET:  Heart-healthy, carbohydrate consistent, unchanged.

 

ACTIVITY LEVEL:  No restrictions.

 

FOLLOWUP:  Please follow up with Dr. Fulton within 3 to 5 days of discharge.  
Strong consideration to establish care with a cardiologist given his 
substantial risk factors, history of CHF.  He was advised to record his weight 
in a log book every day.  Call provider if greater than 3 pounds in 1 day or 5 
pounds in 1 week. He should follow up with [facility] for a sleep study.  
Consideration for followup with Dr. Self at a future date if lifestyle 
modification fails to reduce his super morbid obesity with BMI of 69.

 

TIME SPENT ON DISCHARGE:  35 minutes.

 

 [id number]/CPS #: [id number]

JESS

## 2018-11-04 ENCOUNTER — HOSPITAL ENCOUNTER (OUTPATIENT)
Dept: HOSPITAL 25 - ED | Age: 43
Setting detail: OBSERVATION
LOS: 2 days | Discharge: HOME | End: 2018-11-06
Attending: HOSPITALIST | Admitting: INTERNAL MEDICINE
Payer: COMMERCIAL

## 2018-11-04 DIAGNOSIS — R11.10: ICD-10-CM

## 2018-11-04 DIAGNOSIS — R60.0: ICD-10-CM

## 2018-11-04 DIAGNOSIS — R50.9: ICD-10-CM

## 2018-11-04 DIAGNOSIS — E11.9: ICD-10-CM

## 2018-11-04 DIAGNOSIS — R74.0: ICD-10-CM

## 2018-11-04 DIAGNOSIS — M25.551: ICD-10-CM

## 2018-11-04 DIAGNOSIS — Z79.82: ICD-10-CM

## 2018-11-04 DIAGNOSIS — I50.22: ICD-10-CM

## 2018-11-04 DIAGNOSIS — F17.210: ICD-10-CM

## 2018-11-04 DIAGNOSIS — R79.89: ICD-10-CM

## 2018-11-04 DIAGNOSIS — E66.01: ICD-10-CM

## 2018-11-04 DIAGNOSIS — Z79.01: ICD-10-CM

## 2018-11-04 DIAGNOSIS — M54.9: ICD-10-CM

## 2018-11-04 DIAGNOSIS — L03.115: Primary | ICD-10-CM

## 2018-11-04 LAB
BASOPHILS # BLD AUTO: 0.1 10^3/UL (ref 0–0.2)
EOSINOPHIL # BLD AUTO: 0.2 10^3/UL (ref 0–0.6)
HCT VFR BLD AUTO: 47 % (ref 42–52)
HGB BLD-MCNC: 16 G/DL (ref 14–18)
INR PPP/BLD: 1 (ref 0.77–1.02)
LYMPHOCYTES # BLD AUTO: 2.4 10^3/UL (ref 1–4.8)
MCH RBC QN AUTO: 30 PG (ref 27–31)
MCHC RBC AUTO-ENTMCNC: 34 G/DL (ref 31–36)
MCV RBC AUTO: 87 FL (ref 80–94)
MONOCYTES # BLD AUTO: 0.7 10^3/UL (ref 0–0.8)
NEUTROPHILS # BLD AUTO: 5 10^3/UL (ref 1.5–7.7)
NRBC # BLD AUTO: 0 10^3/UL
NRBC BLD QL AUTO: 0.1
PLATELET # BLD AUTO: 223 10^3/UL (ref 150–450)
RBC # BLD AUTO: 5.36 10^6/UL (ref 4–5.4)
WBC # BLD AUTO: 8.3 10^3/UL (ref 3.5–10.8)

## 2018-11-04 PROCEDURE — 96372 THER/PROPH/DIAG INJ SC/IM: CPT

## 2018-11-04 PROCEDURE — 96375 TX/PRO/DX INJ NEW DRUG ADDON: CPT

## 2018-11-04 PROCEDURE — 83036 HEMOGLOBIN GLYCOSYLATED A1C: CPT

## 2018-11-04 PROCEDURE — 83605 ASSAY OF LACTIC ACID: CPT

## 2018-11-04 PROCEDURE — 84484 ASSAY OF TROPONIN QUANT: CPT

## 2018-11-04 PROCEDURE — 86140 C-REACTIVE PROTEIN: CPT

## 2018-11-04 PROCEDURE — 85730 THROMBOPLASTIN TIME PARTIAL: CPT

## 2018-11-04 PROCEDURE — 80053 COMPREHEN METABOLIC PANEL: CPT

## 2018-11-04 PROCEDURE — 99284 EMERGENCY DEPT VISIT MOD MDM: CPT

## 2018-11-04 PROCEDURE — 85610 PROTHROMBIN TIME: CPT

## 2018-11-04 PROCEDURE — 99406 BEHAV CHNG SMOKING 3-10 MIN: CPT

## 2018-11-04 PROCEDURE — 96376 TX/PRO/DX INJ SAME DRUG ADON: CPT

## 2018-11-04 PROCEDURE — 96374 THER/PROPH/DIAG INJ IV PUSH: CPT

## 2018-11-04 PROCEDURE — 87040 BLOOD CULTURE FOR BACTERIA: CPT

## 2018-11-04 PROCEDURE — 83880 ASSAY OF NATRIURETIC PEPTIDE: CPT

## 2018-11-04 PROCEDURE — 36415 COLL VENOUS BLD VENIPUNCTURE: CPT

## 2018-11-04 PROCEDURE — 85025 COMPLETE CBC W/AUTO DIFF WBC: CPT

## 2018-11-04 PROCEDURE — 93005 ELECTROCARDIOGRAM TRACING: CPT

## 2018-11-04 PROCEDURE — 80048 BASIC METABOLIC PNL TOTAL CA: CPT

## 2018-11-04 PROCEDURE — G0378 HOSPITAL OBSERVATION PER HR: HCPCS

## 2018-11-04 PROCEDURE — 71045 X-RAY EXAM CHEST 1 VIEW: CPT

## 2018-11-04 PROCEDURE — 72100 X-RAY EXAM L-S SPINE 2/3 VWS: CPT

## 2018-11-04 RX ADMIN — INSULIN LISPRO SCH UNIT: 100 INJECTION, SOLUTION INTRAVENOUS; SUBCUTANEOUS at 21:23

## 2018-11-04 RX ADMIN — LISINOPRIL SCH MG: 10 TABLET ORAL at 19:47

## 2018-11-04 RX ADMIN — OXYCODONE HYDROCHLORIDE AND ACETAMINOPHEN PRN TAB: 5; 325 TABLET ORAL at 18:49

## 2018-11-04 RX ADMIN — METOPROLOL SUCCINATE SCH MG: 25 TABLET, EXTENDED RELEASE ORAL at 19:47

## 2018-11-04 RX ADMIN — OXYCODONE HYDROCHLORIDE AND ACETAMINOPHEN PRN TAB: 5; 325 TABLET ORAL at 21:50

## 2018-11-04 RX ADMIN — CEFEPIME HYDROCHLORIDE SCH MLS/HR: 1 INJECTION, SOLUTION INTRAVENOUS at 20:06

## 2018-11-04 RX ADMIN — ENOXAPARIN SODIUM SCH MG: 40 INJECTION SUBCUTANEOUS at 19:47

## 2018-11-04 RX ADMIN — ASPIRIN SCH MG: 81 TABLET, CHEWABLE ORAL at 19:47

## 2018-11-04 NOTE — ED
Back Pain





- HPI Summary


HPI Summary: 


Pt is a 42 year old M presenting to the ED with a chief complaint of back and 

leg pain. The pt reports pain in his lower back onset 11/2/2018 with vomiting 

and fever, and his lower extremity swelled up and became red today. Pt states 

it started in his testicle but he has no trouble urinating.








- History of Current Complaint


Chief Complaint: EDGeneral


Stated Complaint: BACK PAIN


Time Seen by Provider: 11/04/18 16:57


Hx Obtained From: Patient


Onset/Duration: Sudden Onset, Lasting Days - since 11/2/2018, Still Present, 

Worse Since - today


Onset/Duration: Started Days Ago


Timing: Constant


Back Pain Location: Is Discrete @ - lower back, Radiates To - bilateral legs


Severity Initially: Moderate


Severity Currently: Moderate


Pain Intensity: 7


Pain Scale Used: 0-10 Numeric


Character: Throbbing


Aggravating Symptom(s): Movement, Walking


Alleviating Symptom(s): Rest


Associated Signs And Symptoms: Positive: Swelling, Redness, Fever.  Negative: 

Bladder Incontinence





- Allergies/Home Medications


Allergies/Adverse Reactions: 


 Allergies











Allergy/AdvReac Type Severity Reaction Status Date / Time


 


No Known Drug Allergies Allergy  See Comment Verified 11/04/18 15:43


 


[place] Allergy Severe Swelling Uncoded 11/04/18 15:43














PMH/Surg Hx/FS Hx/Imm Hx


Previously Healthy: No


Endocrine/Hematology History: Reports: Hx Anticoagulant Therapy, Hx Diabetes - 

DM x 3 years.  On Rx, FBS ,


   Denies: Hx Thyroid Disease


Cardiovascular History: 


   Denies: Hx Angina, Hx Congestive Heart Failure, Hx Coronary Artery Disease, 

Hx Deep Vein Thrombosis, Hx Hypercholesterolemia, Hx Hypertension, Hx 

Myocardial Infarction, Hx Pacemaker/ICD, Hx Valvular Heart Disease


Respiratory History: Reports: Hx Sleep Apnea


   Denies: Hx Asthma, Hx Chronic Obstructive Pulmonary Disease (COPD), Hx Lung 

Cancer


GI History: 


   Denies: Hx Gall Bladder Disease, Hx Gastrointestinal Bleed, Hx Ulcer, Hx 

Urosepsis


 History: 


   Denies: Hx Kidney Stones, Hx Renal Disease


Musculoskeletal History: Reports: Hx Back Problems


Sensory History: Reports: Hx Contacts or Glasses - when driving


   Denies: Hx Hearing Aid


Opthamlomology History: Reports: Hx Contacts or Glasses - when driving


Neurological History: 


   Denies: Hx Dementia, Hx Migraine, Hx Seizures, Hx Transient Ischemic Attacks 

(TIA)


Psychiatric History: Reports: Hx Depression


   Denies: Hx Anxiety, Hx Schizophrenia, Hx Bipolar Disorder





- Immunization History


Date of Tetanus Vaccine: utd


Date of Influenza Vaccine: none


Infectious Disease History: No


Infectious Disease History: 


   Denies: Hx Clostridium Difficile, Hx Hepatitis, Hx Human Immunodeficiency 

Virus (HIV), Hx of Known/Suspected MRSA, Hx Shingles, Hx Tuberculosis, History 

Other Infectious Disease, Traveled Outside the [place] in Last 30 Days





- Family History


Known Family History: Positive: Cardiac Disease, Hypertension, Diabetes, 

Respiratory Disease, Seizure Disorder





- Social History


Alcohol Use: None


Substance Use Type: Reports: Marijuana


Substance Use Comment - Amount & Last Used: 5/19/17


Hx Tobacco Use: Yes


Smoking Status (MU): Current Every Day Smoker


Type: Cigarettes


Amount Used/How Often: 1 PPD





Review of Systems


Positive: Fever


Positive: Vomiting


Positive: Myalgia - lower back pain, Edema - lower extremities


All Other Systems Reviewed And Are Negative: Yes





Physical Exam





- Summary


Physical Exam Summary: 


 Appearance: Morbidly obese, Well-nourished, lying in bed comfortable


Skin: Warm, dry, Acutely erythematous and warm area on R inner thigh extending 

to the proximal leg. No adenopathy in the groin. No sign of abscess.


Eyes: sclera anicteric, no conjunctival pallor


ENT: mucous membranes moist


Neck: deferred


Respiratory: No signs of respiratory distress


Cardiovascular: Appears well perfused, pulses are nml


Abdomen: deferred


Musculoskeletal: lower extremities  signs of venostasis. 


Neurological: Awake  and alert, mentation is normal, speech is fluent and 

appropriate


Psychiatric: affect is normal, does not appear anxious or depressed








Triage Information Reviewed: Yes


Vital Signs On Initial Exam: 


 Initial Vitals











Temp Pulse Resp BP Pulse Ox


 


 96.9 F   100   16   140/86   95 


 


 11/04/18 15:40  11/04/18 15:40  11/04/18 15:40  11/04/18 15:40  11/04/18 15:40











Vital Signs Reviewed: Yes





Diagnostics





- Vital Signs


 Vital Signs











  Temp Pulse Resp BP Pulse Ox


 


 11/04/18 15:40  96.9 F  100  16  140/86  95














- Laboratory


Lab Results: 


 Lab Results











  11/04/18 11/04/18 11/04/18 Range/Units





  16:25 16:25 16:25 


 


WBC  8.3    (3.5-10.8)  10^3/ul


 


RBC  5.36    (4.00-5.40)  10^6/ul


 


Hgb  16.0    (14.0-18.0)  g/dl


 


Hct  47    (42-52)  %


 


MCV  87    (80-94)  fL


 


MCH  30    (27-31)  pg


 


MCHC  34    (31-36)  g/dl


 


RDW  14    (10.5-15)  %


 


Plt Count  223    (150-450)  10^3/ul


 


MPV  7.7    (7.4-10.4)  fL


 


Neut % (Auto)  60.0    (38-83)  %


 


Lymph % (Auto)  28.7    (25-47)  %


 


Mono % (Auto)  8.4 H    (0-7)  %


 


Eos % (Auto)  2.0    (0-6)  %


 


Baso % (Auto)  0.9    (0-2)  %


 


Absolute Neuts (auto)  5.0    (1.5-7.7)  10^3/ul


 


Absolute Lymphs (auto)  2.4    (1.0-4.8)  10^3/ul


 


Absolute Monos (auto)  0.7    (0-0.8)  10^3/ul


 


Absolute Eos (auto)  0.2    (0-0.6)  10^3/ul


 


Absolute Basos (auto)  0.1    (0-0.2)  10^3/ul


 


Absolute Nucleated RBC  0    10^3/ul


 


Nucleated RBC %  0.1    


 


INR (Anticoag Therapy)   1.00   (0.77-1.02)  


 


APTT   31.4   (26.0-36.3)  seconds


 


Sodium    132 L  (135-145)  mmol/L


 


Potassium    4.0  (3.5-5.0)  mmol/L


 


Chloride    99 L  (101-111)  mmol/L


 


Carbon Dioxide    24  (22-32)  mmol/L


 


Anion Gap    9  (2-11)  mmol/L


 


BUN    10  (6-24)  mg/dL


 


Creatinine    0.61 L  (0.67-1.17)  mg/dL


 


Est GFR ( Amer)    174.6  (>60)  


 


Est GFR (Non-Af Amer)    144.3  (>60)  


 


BUN/Creatinine Ratio    16.4  (8-20)  


 


Glucose    413 H  ()  mg/dL


 


Lactic Acid     (0.5-2.0)  mmol/L


 


Calcium    8.8  (8.6-10.3)  mg/dL


 


Total Bilirubin    0.40  (0.2-1.0)  mg/dL


 


AST    16  (13-39)  U/L


 


ALT    32  (7-52)  U/L


 


Alkaline Phosphatase    50  ()  U/L


 


Troponin I    0.00  (<0.04)  ng/mL


 


C-Reactive Protein    191.52 H  (<8.01)  mg/L


 


Total Protein    6.5  (6.4-8.9)  g/dL


 


Albumin    3.4  (3.2-5.2)  g/dL


 


Globulin    3.1  (2-4)  g/dL


 


Albumin/Globulin Ratio    1.1  (1-3)  














  11/04/18 Range/Units





  16:25 


 


WBC   (3.5-10.8)  10^3/ul


 


RBC   (4.00-5.40)  10^6/ul


 


Hgb   (14.0-18.0)  g/dl


 


Hct   (42-52)  %


 


MCV   (80-94)  fL


 


MCH   (27-31)  pg


 


MCHC   (31-36)  g/dl


 


RDW   (10.5-15)  %


 


Plt Count   (150-450)  10^3/ul


 


MPV   (7.4-10.4)  fL


 


Neut % (Auto)   (38-83)  %


 


Lymph % (Auto)   (25-47)  %


 


Mono % (Auto)   (0-7)  %


 


Eos % (Auto)   (0-6)  %


 


Baso % (Auto)   (0-2)  %


 


Absolute Neuts (auto)   (1.5-7.7)  10^3/ul


 


Absolute Lymphs (auto)   (1.0-4.8)  10^3/ul


 


Absolute Monos (auto)   (0-0.8)  10^3/ul


 


Absolute Eos (auto)   (0-0.6)  10^3/ul


 


Absolute Basos (auto)   (0-0.2)  10^3/ul


 


Absolute Nucleated RBC   10^3/ul


 


Nucleated RBC %   


 


INR (Anticoag Therapy)   (0.77-1.02)  


 


APTT   (26.0-36.3)  seconds


 


Sodium   (135-145)  mmol/L


 


Potassium   (3.5-5.0)  mmol/L


 


Chloride   (101-111)  mmol/L


 


Carbon Dioxide   (22-32)  mmol/L


 


Anion Gap   (2-11)  mmol/L


 


BUN   (6-24)  mg/dL


 


Creatinine   (0.67-1.17)  mg/dL


 


Est GFR ( Amer)   (>60)  


 


Est GFR (Non-Af Amer)   (>60)  


 


BUN/Creatinine Ratio   (8-20)  


 


Glucose   ()  mg/dL


 


Lactic Acid  3.0 H*  (0.5-2.0)  mmol/L


 


Calcium   (8.6-10.3)  mg/dL


 


Total Bilirubin   (0.2-1.0)  mg/dL


 


AST   (13-39)  U/L


 


ALT   (7-52)  U/L


 


Alkaline Phosphatase   ()  U/L


 


Troponin I   (<0.04)  ng/mL


 


C-Reactive Protein   (<8.01)  mg/L


 


Total Protein   (6.4-8.9)  g/dL


 


Albumin   (3.2-5.2)  g/dL


 


Globulin   (2-4)  g/dL


 


Albumin/Globulin Ratio   (1-3)  











Result Diagrams: 


 11/04/18 16:25





 11/04/18 16:25


Lab Statement: Any lab studies that have been ordered have been reviewed, and 

results considered in the medical decision making process.





- Radiology


  ** Chest xray


Radiology Interpretation Completed By: Radiologist


Summary of Radiographic Findings: Hazy densities overlying the bilateral lungs 

could be due to pulmonary edema,.  pneumonitis or simply be the consequence of 

the patient's body habitus preventing adequate.  beam penetration.





- EKG


  ** 1819


Cardiac Rate: NL - 94bpm


EKG Rhythm: Sinus Rhythm


ST Segment: Normal


Ectopy: None





Back Pain Course/Dx





- Course


Course Of Treatment: Pt is a 41 y/o M presenting to the ED with a chief 

complaint of back and leg pain. He has been dx'ed with a UTI in the past but 

presently has no urinary sx. He has been septic before as well. The pt's legs 

are erythematous and swollen.





Discharge





- Sign-Out/Discharge


Documenting (check all that apply): Patient Departure - admit





- Discharge Plan


Condition: Stable


Disposition: ADMITTED TO [facility]


Referrals: 


Kirstin HUTCHINSON,Hari GARCIA [Primary Care Provider] - 





- Attestation Statements


Document Initiated by Scribe: Yes


Documenting Scribe: Jacquelyn Lawrence


Provider For Whom Scribe is Documenting (Include Credential): Jarrod Dunn MD.


Scribe Attestation: 


IJacquelyn, scribed for Jarrod Dunn MD. on 11/04/18 at 1806. 





Consult


Consult: 


0350  Spoke with Dr. Storm about the pts hx and present condition. He will 

be the accepting physician.

## 2018-11-04 NOTE — XMS REPORT
Continuity of Care Document (CCD)

 Created on:2018



Patient:Elijah Motta

Sex:Male

:1975

External Reference #:[id number]





Demographics







 Address  [address]



   [place] [address]

 

 Home Phone  [phone]

 

 Mobile Phone  [phone]

 

 Email Address  [email or web address]

 

 Preferred Language  en

 

 Marital Status  Not  or 

 

 Jain Affiliation  Unknown

 

 Race  White

 

 Ethnic Group  Not  or 









Author







 Name  Jonathan E. Cryer, MD

 

 Address  [address]



   Unavailable



   [place] [address]









Support







 Name  Relationship  Address  Phone

 

 Unavailable  Wife  Unavailable  [phone]









Care Team Providers







 Name  Role  Phone

 

 Mati López M.D.  Care Team Information   Unavailable









Payers







 Type  Date  Identification Numbers  Payment Provider  Subscriber

 

     Policy Number: [id number]  Stan Motta









 PayID: [id number]  [address]









 [place] [address]







Advance Directives







 Description

 

 No Information Available







Problems







 Date  Description  Provider  Status

 

 Onset: 10/26/2018  Essential hypertension  Jonathan E. Cryer, MD  Active







Family History







 Description

 

 No Information Available







Social History







 Type  Date  Description  Comments

 

 Birth Sex    Unknown  

 

 Occupation      

 

 Tobacco Use  Start: Unknown  Current Cigarette Smoker 1 Pack Daily  

 

 Tobacco Use  Start: Unknown  Never Smoked Cigars  

 

 Tobacco Use  Start: Unknown  Never Smoked A Pipe  

 

 Smokeless Tobacco    Never Used Smokeless Tobacco  

 

 ETOH Use    Never consumed alcohol  

 

 Tobacco Use  Start: Unknown  Patient is a current smoker, smokes  



     every day  

 

 Smoking Status  Reviewed: 10/26/18  Patient is a current smoker, smokes  



     every day  







Allergies, Adverse Reactions, Alerts







 Description

 

 No Known Drug Allergies







Medications







 Medication  Date  Status  Form  Strength  Qnty  SIG  Indications  Ordering



                 Provider

 

 Furosemide    Active  Tablets  20mg        Mauser,



   000              Young ADAMES

 

 Metoprolol    Active  Tablets ER  25mg        Mauser,



 Succinate ER  000    24HR          Young ADAMES

 

 Atorvastatin    Active  Tablets  40mg    Take 1    Unknown



 Calcium  000          Tablet    



             By Mouth    



             Every    



             Day    

 

 Glimepiride    Active  Tablets  4mg        Jerel Fulton MD

 

 Lisinopril    Active  Tablets  10mg        Mauser



   000              Young ADAMES

 

 Metformin HCL    Active  Tablets  1000mg    Take 1    Unknown



   000          Tablet    



             By Mouth    



             Twice A    



             Day With    



             Meals    

 

 Freestyle Lite    Active  Strips      Test    Unknown



 Test  000          Twice A    



             Day    

 

 Ibuprofen    Active  Tablets  800mg    Take 1    Unknown



   000          Tablet    



             By Mouth    



             Three    



             Times A    



             Day as    



             Needed    



             With    



             Food    

 

 Flovent HFA    Active  Aerosol  220mcg/Act        Fulton,



   Jerel Noriega MD







Immunizations







 Description

 

 No Information Available







Vital Signs







 Date  Vital  Result  Comment

 

 10/26/2018  9:08am  Weight  426.00 lb  









 Weight  193.234 kg  

 

 Height  69 inches  5'9"

 

 Height in cm's  175.3 cm  

 

 BMI (Body Mass Index)  62.9 kg/m2  







Results







 Test  Date  Facility  Test  Result  H/L  Range  Note

 

 Laboratory test  10/26/2018  [facility]  Surgical  <
pending>      



 finding    c/o Department of Laboratories  Pathology        



     [place] [address]          



     [phone]          







Procedures







 Description

 

 No Information Available







Encounters







 Type  Date  Location  Provider  Dx  Diagnosis

 

 Office Visit  10/26/2018  [facility],After  Young [id number]  Benign neoplasm of



   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



           unspecified







Plan of Treatment

No Information Available

## 2018-11-04 NOTE — XMS REPORT
Elijah Motta

 Created on:2018



Patient:Elijah Motta

Sex:Male

:1975

External Reference #:[id number]





Demographics







 Address  [address] [place] [address]

 

 Home Phone  [phone]

 

 Mobile Phone  [phone]

 

 Email Address  [email or web address]

 

 Preferred Language  English

 

 Marital Status  Not  Or 

 

 Mandaen Affiliation  Unknown

 

 Race  White

 

 Ethnic Group  Not  Or 









Author







 Organization  [facility]

 

 Address  [address]



   [place] [address]

 

 Phone  [phone]









Support







 Name  Relationship  Address  Phone

 

 Jeannie Motta  Unavailable  Unavailable  [phone]









Care Team Providers







 Name  Role  Phone

 

 Hari Fulton MD  Primary Care Physician  Unavailable









Payers







 Type  Date  Identification Numbers  Payment Provider  Subscriber

 

 [employer]  Effective:  Policy Number:  Stan Motta



   2014  [id number]    









 PayID: [id number]  [address]









 [place] [address]







Problems







 Description

 

 No Information







Family History







 Date  Family Member(s)  Problem(s)  Comments

 

   Father   due to stroke  ()

 

   Father  Alcoholism  

 

   Mother  Hypertension  

 

   Siblings  6  







Social History







 Type  Date  Description  Comments

 

 Marital Status      

 

 Lives With    Wife  

 

 Lives With    Children  2 boys 1 girl

 

 Lives With    4 cats  

 

 Occupation      

 

 Cigarette Use    continues to smoke  

 

 Cigarette Use    Heavy tobacco smoker (more than 10  



     cigarettes/day)  

 

 ETOH Use    Denies alcohol use  

 

 Recreational Drug Use    Marijuana  occasionally

 

 Smoking    Patient is a current smoker, smokes  1 ppd x 25 years



     every day  

 

 Daily Caffeine    Consumes on average 3 cups of regular  Large iced coffee



     coffee per day  

 

 Exercise Type/Frequency    [facility] member  

 

 Exercise Type/Frequency    Exercises regularly  

 

 Exercise Type/Frequency    Exercises at a health club 2 times a  At least



     week  







Allergies, Adverse Reactions, Alerts







 Date  Description  Reaction  Status  Severity  Comments

 

 2014  NKDA    active    







Medications







 Medication  Date  Status  Form  Strength  Qnty  SIG  Indications  Ordering



                 Provider

 

 Aspirin Ec  /  Active  Tablets DR  81mg    1 by mouth  I42.9  Young



             every day    ARIANA Rao M.D.

 

 Metformin HCL  /  Active  Tablets  1000mg    1 by mouth    Unknown



   0000          twice a    



             day    

 

 8 HR Arthritis  00/  Active  Tablets  650mg    as needed    Unknown



 Tylenol  0000              

 

 Lisinopril  /  Active  Tablets  10mg  90tabs  1 by mouth  I42.9  Young



   0000          every day    ARIANA Rao M.D.

 

 Furosemide  /  Active  Tablets  20mg  90tabs  1 by mouth    Young



   0000          5 days    oly Orellana M.D.

 

 Glimepiride  /  Active  Tablets  4mg    2 by mouth    Unknown



   0000          every day    

 

 Metoprolol  /  Active  Tablets ER  25mg  180tab  1 by mouth    Young



 Succinate ER  0000    24HR    s  every day    ARIANA Rao M.D.

 

 Atorvastatin  /  Active  Tablets  40mg  90tabs  1 by mouth    Young



 Calcium  0000          every day    ARIANA Rao M.D.

 

                 

 

 Lisinopril  /  Hx  Tablets  2.5mg  30tabs  1 by mouth  425.4  Young



    -          in evening    ARIANA Rao,



                 M.DAng



                 

 

 Levaquin  /  Hx  Tablets  750mg  14tabs  1 by mouth  682.9  Silvestre DAng



    -          every day    Macqueen,



                 M.DAng



                 

 

 Lisinopril  /  Hx  Tablets  2.5mg    1 by mouth    Unknown



   0000 -          every day    



   2014              

 

 Metoprolol  /  Hx  Tablets  25mg  30tabs  1/2 tab by    Young



 Tartrate  0000 -          mouth once    ARIANA Rao,



   /           day    M.D.



                 

 

 Aspirin Ec  /  Hx  Tablets DR  81mg    1 by mouth    Unknown



   0000 -          every day    



   2014              

 

 Cymbalta  /  Hx  Caps DR  20mg    1 by mouth    Unknown



   0000 -    Part      twice    



   /          daily    



                 

 

 Lidoderm  /  Hx  Patches  5%    apply to    Unknown



   0000 -          left    



             shoulder    



   2014          once daily    



             - 12 hours    



             on, 12    



             hours off    

 

 Percocet  /  Hx  Tablets  5-325mg    1-2 by    Unknown



   0000 -          mouth    



   /          every  to    



             6 hours as    



             needed    



             pain    

 

 Cymbalta  /  Hx  Caps DR  60mg    1 by mouth    Unknown



   0000 -    Part      every day    



   2018              

 

 Gabapentin  /  Hx  Capsules  100mg    5 caps q 8    Unknown



   0000 -          hrs    



   2018              

 

 Ibuprofen  00//  Hx  Tablets  200mg  100tab  as needed    Unknown



   0000 -        s      



   2018              







Vital Signs







 Date  Vital  Result  Comment

 

 10/12/2018  Height  69 inches  5'9"









 Weight  420.00 lb  

 

 Heart Rate  90 /min  

 

 BP Systolic Sitting  138 mmHg  

 

 BP Diastolic Sitting  78 mmHg  

 

 Respiratory Rate  20 /min  

 

 Body Temperature  98.2 F  

 

 BMI (Body Mass Index)  62.0 kg/m2  









 2018  Height  69 inches  5'9"









 Weight  433.50 lb  

 

 Heart Rate  92 /min  

 

 BP Systolic Sitting  126 mmHg  Lue large cuff

 

 BP Diastolic Sitting  86 mmHg  Lue large cuff

 

 Respiratory Rate  20 /min  

 

 O2 % BldC Oximetry  93 %  On Ra

 

 BMI (Body Mass Index)  64.0 kg/m2  

 

 Neck Circumference in inches  19.5  









 2018  Height  69 inches  5'9"









 Weight  436.00 lb  

 

 Heart Rate  84 /min  

 

 BP Systolic Sitting  126 mmHg  Rue lg cuff

 

 BP Diastolic Sitting  80 mmHg  Rue lg cuff

 

 BMI (Body Mass Index)  64.4 kg/m2  

 

 Ejection Fraction  55-60%  Echo 7/10/18









 2018  Height  69 inches  5'9"









 Weight  435.25 lb  

 

 Heart Rate  88 /min  

 

 BP Systolic Sitting  132 mmHg  lae lg cuff

 

 BP Diastolic Sitting  62 mmHg  lae lg cuff

 

 BP Systolic Standing  128 mmHg  la repEAT Sitting

 

 BP Diastolic Standing  64 mmHg  la repEAT Sitting

 

 Respiratory Rate  18 /min  

 

 BMI (Body Mass Index)  64.3 kg/m2  

 

 Ejection Fraction  50%  5/15/2018









 2014  Height  69 inches  5'9"









 Weight  447.75 lb  with shoes

 

 Heart Rate  96 /min  

 

 BP Systolic Sitting  110 mmHg  LA lg cuff

 

 BP Diastolic Sitting  86 mmHg  LA lg cuff

 

 Respiratory Rate  17 /min  

 

 BMI (Body Mass Index)  66.1 kg/m2  









 2014  Height  69 inches  5'9"









 Weight  452.00 lb  

 

 Heart Rate  97 /min  

 

 BP Systolic Sitting  122 mmHg  

 

 BP Diastolic Sitting  85 mmHg  

 

 Respiratory Rate  20 /min  

 

 Body Temperature  99.3 F  

 

 BMI (Body Mass Index)  66.7 kg/m2  







Results







 Test  Date  Test  Result  H/L  Range  Note

 

 Laboratory test finding  06/15/2018  Hemoglobin A1c (Glyco  8.3 %  High  4.0-
5.6  1



     HGB)        

 

 Comp Metabolic Panel  06/15/2018  Sodium  136 mmol/L    135-145  









 Potassium  4.3 mmol/L    3.5-5.0  

 

 Chloride  100 mmol/L  Low  101-111  

 

 Co2 Carbon Dioxide  27 mmol/L    22-32  

 

 Anion Gap  9 mmol/L    2-11  

 

 Glucose  228 mg/dL  High    

 

 Blood Urea Nitrogen  9 mg/dL    6-24  

 

 Creatinine  0.62 mg/dL  Low  0.67-1.17  

 

 BUN/Creatinine Ratio  14.5    8-20  

 

 Calcium  9.2 mg/dL    8.6-10.3  

 

 Total Protein  6.6 g/dL    6.4-8.9  

 

 Albumin  3.8 g/dL    3.2-5.2  

 

 Globulin  2.8 g/dL    2-4  

 

 Albumin/Globulin Ratio  1.4    1-3  

 

 Total Bilirubin  0.70 mg/dL    0.2-1.0  

 

 Alkaline Phosphatase  40 U/L      

 

 Alt  26 U/L    7-52  

 

 Ast  21 U/L    13-39  

 

 Egfr Non-  141.6    >60  

 

 Egfr   182.1    >60  2









 Urine Microalbumin Random  06/15/2018  Ur Microalbumin (mg/L)  < 15.0 mg/L    
  









 Urine Creatinine  95.45 mg/dL      

 

 Urine Microalbumin/Creatinine  TNP ug/mg    <31  3









 Laboratory test finding  06/15/2018  B-Type Natriuretic Peptide BNP  6 pg/mL  
    4









 Magnesium  1.8 mg/dL  Low  1.9-2.7  









 CBC W/Auto Diff  06/15/2018  White Blood Count  6.0 10^3/uL    3.5-10.8  









 Red Blood Count  5.55 10^6/uL  High  4.00-5.40  

 

 Hemoglobin  16.3 g/dL    14.0-18.0  

 

 Hematocrit  48 %    42-52  

 

 Mean Corpuscular Volume  86 fL    80-94  

 

 Mean Corpuscular Hemoglobin  30 pg    27-31  

 

 Mean Corpuscular HGB Conc  34 g/dL    31-36  

 

 Red Cell Distribution Width  15 %    10.5-15  

 

 Platelet Count  245 10^3/uL    150-450  

 

 Mean Platelet Volume  8.0 um3    7.4-10.4  

 

 Abs Neutrophils  3.0 10^3/uL    1.5-7.7  

 

 Abs Lymphocytes  2.3 10^3/uL    1.0-4.8  

 

 Abs Monocytes  0.5 10^3/uL    0-0.8  

 

 Abs Eosinophils  0.2 10^3/uL    0-0.6  

 

 Abs Basophils  0 10^3/uL    0-0.2  

 

 Abs Nucleated RBC  0 10^3/uL      

 

 Granulocyte %  50.4 %    38-83  

 

 Lymphocyte %  37.8 %    25-47  

 

 Monocyte %  7.6 %  High  0-7  

 

 Eosinophil %  3.7 %    0-6  

 

 Basophil %  0.5 %    0-2  

 

 Nucleated Red Blood Cells %  0.2      









 Lipid Panel  06/15/2018  Triglycerides  71 mg/dL      5









 Cholesterol  103 mg/dL      6

 

 HDL Cholesterol  39.9 mg/dL      7

 

 LDL Cholesterol  49 mg/dL      8









 Lipid Panel - [facility]  06/15/2018  Creatine Kinase(CK)  38 U/L      

 

 CBC No Diff  2014  White Blood Count  7.8 10^3/uL    4.8-10.8  









 Red Blood Count  5.36 10^6/uL    4.0-5.4  

 

 Hemoglobin  15.3 g/dL    14.0-18.0  

 

 Hematocrit  45 %    42-52  

 

 Mean Corpuscular Volume  85 fL    80-94  

 

 Mean Corpuscular Hemoglobin  28 pg    27-31  

 

 Mean Corpuscular HGB Conc  34 g/dL    31-36  

 

 Red Cell Distribution Width  15 %    10.5-15  

 

 Platelet Count  316 10^3/uL    150-450  

 

 Mean Platelet Volume  8 um3    7.4-10.4  









 Comp Metabolic Panel  2014  Sodium  136 mmol/L    133-145  









 Potassium  4.4 mmol/L    3.7-5.6  

 

 Chloride  96 mmol/L  Low  101-111  

 

 Co2 Carbon Dioxide  33 mmol/L  High  22-32  

 

 Anion Gap  7 mmol/L    2-11  

 

 Glucose  97 mg/dL      

 

 Blood Urea Nitrogen  11 mg/dL    6-24  

 

 Creatinine  0.64 mg/dL  Low  0.67-1.17  

 

 BUN/Creatinine Ratio  17.2    8-20  

 

 Calcium  9.1 mg/dL    8.6-10.3  

 

 Total Protein  7.1 g/dL    6.4-8.9  

 

 Albumin  3.6 g/dL    3.2-5.2  

 

 Globulin  3.5 g/dL    2-4  

 

 Albumin/Globulin Ratio  1.0    1-3  

 

 Total Bilirubin  0.40 mg/dL    0.2-1.0  

 

 Alkaline Phosphatase  46 U/L      

 

 Alt  17 U/L    7-52  

 

 Ast  13 U/L    13-39  

 

 Egfr Non-  139.2    >60  

 

 Egfr   179.1    >60  9









 Laboratory test finding  2014  C Reactive Protein  37.06 mg/L  High  < 
5.00  10









 1  Therapeutic target for the treatment of diabetes



   mellitus patients is <7% HBA1C, and in selective



   patients <6.0%.  Please refer to American Diabetes



   Association diabetic care guidelines for further



   information.

 

 2  *******Because ethnic data is not always readily available,



   this report includes an eGFR for both -Americans and



   non- Americans.****



   The National Kidney Disease Education Program (NKDEP) does



   not endorse the use of the MDRD equation for patients that



   are not between the ages of 18 and 70, are pregnant, have



   extremes of body size, muscle mass, or nutritional status,



   or are non- or non-.



   According to the National Kidney Foundation, irrespective of



   diagnosis, the stage of the disease is based on the level of



   kidney function:



   Stage Description                      GFR(mL/min/1.73 m(2))



   1     Kidney damage with normal or decreased GFR       90



   2     Kidney damage with mild decrease in GFR          60-89



   3     Moderate decrease in GFR                         30-59



   4     Severe decrease in GFR                           15-29



   5     Kidney failure                       <15 (or dialysis)

 

 3  Unable to calculate due to low microalbumin

 

 4  >100 to <200 pg/mL: likely compensated congestive heart



   failure (CHF)



   200 to 400 pg/mL: likely moderate CHF



   >400 pg/mL: likely moderate to severe CHF

 

 5  Desirable: <150



   Borderline High: 150-199



   High: 200-499



   Very High: >500

 

 6  Desirable: <200



   Borderline High: 200-239



   High: >239

 

 7  Low: <40



   Desirable: 40-60



   High: >60

 

 8  Desirable: <100



   Near Optimal: 100-129



   Borderline High: 130-159



   High: 160-189



   Very High: >189

 

 9  *******Because ethnic data is not always readily available,



   this report includes an eGFR for both -Americans and



   non- Americans.****



   The National Kidney Disease Education Program (NKDEP) does



   not endorse the use of the MDRD equation for patients that



   are not between the ages of 18 and 70, are pregnant, have



   extremes of body size, muscle mass, or nutritional status,



   or are non- or non-.



   According to the National Kidney Foundation, irrespective of



   diagnosis, the stage of the disease is based on the level of



   kidney function:



   Stage Description                      GFR(mL/min/1.73 m(2))



   1     Kidney damage with normal or decreased GFR       90



   2     Kidney damage with mild decrease in GFR          60-89



   3     Moderate decrease in GFR                         30-59



   4     Severe decrease in GFR                           15-29



   5     Kidney failure                       <15 (or dialysis)

 

 10  Acute inflammation:  >10.00







Procedures







 Date  CPT Code  Description  Status

 

 10/12/2018  86378  Each Additional Biopsy  Completed

 

 10/12/2018  62415  Biopsy Skin Lesion Single  Completed

 

 2018  67129  Stress ECHO Interpretation/Report Hospital  Completed

 

 2018  67796  Treadmill Interp/Report Only  Completed

 

 2018  58146  Stress Test Supervsn W/Out I/R  Completed

 

 07/10/2018  80006  Echocardiogram, Limited Study  Completed

 

 2018  65063  Diffusing Capacity  Completed

 

 2018  38646  Plethysmography Determination Lung Volumes & Per Airway  
Completed



     Resist  

 

 2018  95312  Pulmonary Function><Bronchodil  Completed

 

 2018  76050  EKG Tracing & Interpretation  Completed

 

 05/15/2018  88100  ECHO Transthorasic Realtime 2D W Doppler & Color Flow  
Completed



     Hosp  

 

 2015  11272  Echocardiogram, Limited Study  Completed

 

 10/20/2014  97038  Holter Monitoring 24 HR New  Completed

 

 2014  73544  EKG Tracing & Interpretation  Completed

 

 2014  07513  EKG, Interpretation Only  Completed

 

 2014  79436  Color Flow Doppler/Interp & Reprt  Completed

 

 2014  65645  Pulse Wave/Continuous-Interp.RPT  Completed

 

 2014  77119  Echocardiography, Transesophageal, Real Time W/Image 2D  
Completed



     W/W/O M-M  







Encounters







 Type  Date  Location  Provider  CPT E/M  Dx

 

 Office Visit  2018  [facility]  Charisse Da Silva MD  [address]  
R06.83



   11:00a  [facility]      









 G47.33

 

 E66.01

 

 F17.210

 

 Z68.44









 Office Visit  2018 11:30a  [facility]  Aixa Green, N.P.  
[address]  I50.23









 F17.210

 

 I42.9

 

 E66.01

 

 E11.9

 

 R94.31









 Office Visit  2018  3:00p  [facility]  Young Rao,  [address]
  I50.23



       M.DAng    









 E66.01

 

 G47.33

 

 I50.9

 

 I42.9

 

 E11.9

 

 R94.31









 Office Visit  2018  3:03p  [facility]  Porfirio Kennedy [place]  [address]
  I50.23



     Hospitalists      









 G47.33

 

 E66.01

 

 Z68.45









 Office Visit  05/15/2018  3:02p  [facility],yoko Paredes[place]  
[address]  I50.23



     Hospitalists      









 G47.33

 

 R79.89

 

 Z68.45









 Office Visit  2018  2:59p  [facility],[place]  
[address]  I50.23



     Hospitalists      









 G47.33

 

 R79.89

 

 Z68.45









 Office Visit  2014  9:30a  [facility]  LEANDRO Mckeon  [address]  
425.4









 278.01

 

 790.7

 

 799.02

 

 327.23









 Office Visit  2014  2:40p  [facility]  Silvestre Pascal,  
[address]  682.9



     Infectious Diseases  M.D.    

 

 Office Visit  2014 10:56a  [facility]  Silvestre Pascal,  
[address]1158  041.02



     Infectious Diseases  M.D.    









 790.7

 

 995.91

 

 682.6

 

 719.41

 

 278.00

 

 425.4









 Office Visit  2014  1:38p  [facility]  Young Rao,  [address]
  425.4



       M.D.    

 

 Office Visit  2014  2:59p  [facility],  Kimberly Lebron,  
[address]  038.9



     Hospitalists  M.D.    









 682.9

 

 278.01

 

 719.41









 Office Visit  2014  2:58p  [facility],  Kimberly Lebron,  
[address]379  038.9



     Hospitalists  M.D.    









 682.9

 

 278.01

 

 719.41









 Office Visit  2014  2:58p  [facility],  Kimberly Lebron,  
[address]696  038.9



     Hospitalists  M.D.    









 682.9

 

 278.01

 

 719.41









 Office Visit  2014  2:57p  [facility],  Kimberly Lebron,  
[address]41  682.9



     Hospitalists  M.D.    









 038.9

 

 719.41

 

 278.01









 Office Visit  2014  9:30a  [facility]  Silvestre Pascal,  
[address]3875  041.02



     Infectious Diseases  M.D.    









 790.7

 

 682.6

 

 719.41

 

 278.00









 Office Visit  2014  2:57p  [facility],  Aixa Green,  
[address]  038.9



     Hospitalists  N.P.    









 682.9

 

 278.01

 

 486







Plan of Care

Future Appointment(s):10/18/2018 10:15 am - Nannette Peres DNP, RN, FNP-BC at 
[facility]10/2018 - Mati López M.D.D48.5 
Neoplasm of uncertain behavior of skinFollow up:As needed

## 2018-11-04 NOTE — XMS REPORT
Steven Paula

 Created on:2018



Patient:Steven Paula

Sex:Male

:1975

External Reference #:[id number]





Demographics







 Address  [address] [place] [address]

 

 Home Phone  [phone]

 

 Mobile Phone  [phone]

 

 Email Address  [email or web address]

 

 Preferred Language  English

 

 Marital Status  Not  Or 

 

 Mormon Affiliation  Unknown

 

 Race  White

 

 Ethnic Group  Not  Or 









Author







 Organization  [facility]

 

 Address  [address]



   [place] [address]

 

 Phone  [phone]









Support







 Name  Relationship  Address  Phone

 

 Jeannie Paula  Unavailable  Unavailable  [phone]









Care Team Providers







 Name  Role  Phone

 

 Hari Fulton MD  Primary Care Physician  Unavailable









Payers







 Type  Date  Identification Numbers  Payment Provider  Subscriber

 

 Commercial  Effective:  Policy Number:  Stan Paula



   2014  [id number]    









 PayID: [id number]  [address]









 [place] [address]







Problems







 Description

 

 No Information







Family History







 Date  Family Member(s)  Problem(s)  Comments

 

   Father   due to stroke  ()

 

   Father  Alcoholism  

 

   Mother  Hypertension  

 

   Siblings  6  







Social History







 Type  Date  Description  Comments

 

 Marital Status      

 

 Lives With    Wife  

 

 Lives With    Children  2 boys 1 girl

 

 Lives With    4 cats  

 

 Occupation      

 

 Cigarette Use    continues to smoke  

 

 Cigarette Use    Heavy tobacco smoker (more than 10  



     cigarettes/day)  

 

 ETOH Use    Denies alcohol use  

 

 Recreational Drug Use    Marijuana  occasionally

 

 Smoking    Patient is a current smoker, smokes  1 ppd x 25 years



     every day  

 

 Daily Caffeine    Consumes on average 3 cups of regular  Large iced coffee



     coffee per day  

 

 Exercise Type/Frequency     member  

 

 Exercise Type/Frequency    Exercises regularly  

 

 Exercise Type/Frequency    Exercises at a health club 2 times a  At least



     week  







Allergies, Adverse Reactions, Alerts







 Date  Description  Reaction  Status  Severity  Comments

 

 2014  NKDA    active    







Medications







 Medication  Date  Status  Form  Strength  Qnty  SIG  Indications  Ordering



                 Provider

 

 Aspirin Ec  /  Active  Tablets DR  81mg    1 by mouth  I42.9  Young



             every day    ARIANA Rao M.D.

 

 Metformin HCL  /  Active  Tablets  1000mg    1 by mouth    Unknown



   0000          twice a    



             day    

 

 8 HR Arthritis  00/  Active  Tablets  650mg    as needed    Unknown



 Tylenol  0000              

 

 Lisinopril  /  Active  Tablets  10mg  90tabs  1 by mouth  I42.9  Young



   0000          every day    ARIANA Rao M.D.

 

 Furosemide  /  Active  Tablets  20mg  90tabs  1 by mouth    Young



   0000          5 days    oly Orellana M.D.

 

 Glimepiride  /  Active  Tablets  4mg    2 by mouth    Unknown



   0000          every day    

 

 Metoprolol  /  Active  Tablets ER  25mg  180tab  1 by mouth    Young



 Succinate ER  0000    24HR    s  every day    ARIANA Rao M.D.

 

 Atorvastatin  /  Active  Tablets  40mg  90tabs  1 by mouth    Young



 Calcium  0000          every day    ARIANA Rao M.D.

 

                 

 

 Lisinopril  /  Hx  Tablets  2.5mg  30tabs  1 by mouth  425.4  Young



    -          in evening    ARIANA Rao,



                 M.DAng



                 

 

 Levaquin  /  Hx  Tablets  750mg  14tabs  1 by mouth  682.9  Silvestre DAng



    -          every day    Macqueen,



                 M.DAng



                 

 

 Lisinopril  /  Hx  Tablets  2.5mg    1 by mouth    Unknown



   0000 -          every day    



   2014              

 

 Metoprolol  /  Hx  Tablets  25mg  30tabs  1/2 tab by    Young



 Tartrate  0000 -          mouth once    ARIANA Rao,



   /           day    M.D.



                 

 

 Aspirin Ec  /  Hx  Tablets DR  81mg    1 by mouth    Unknown



   0000 -          every day    



   2014              

 

 Cymbalta  /  Hx  Caps DR  20mg    1 by mouth    Unknown



   0000 -    Part      twice    



   /          daily    



                 

 

 Lidoderm  /  Hx  Patches  5%    apply to    Unknown



   0000 -          left    



             shoulder    



   2014          once daily    



             - 12 hours    



             on, 12    



             hours off    

 

 Percocet  /  Hx  Tablets  5-325mg    1-2 by    Unknown



   0000 -          mouth    



   /          every  to    



             6 hours as    



             needed    



             pain    

 

 Cymbalta  /  Hx  Caps DR  60mg    1 by mouth    Unknown



   0000 -    Part      every day    



   2018              

 

 Gabapentin  /  Hx  Capsules  100mg    5 caps q 8    Unknown



   0000 -          hrs    



   2018              

 

 Ibuprofen  00/00/  Hx  Tablets  200mg  100tab  as needed    Unknown



   0000 -        s      



   2018              







Vital Signs







 Date  Vital  Result  Comment

 

 10/18/2018  Height  69 inches  5'9"









 Weight  431.12 lb  

 

 Heart Rate  88 /min  

 

 BP Systolic Sitting  124 mmHg  Lue regular cuff (lower arm)

 

 BP Diastolic Sitting  76 mmHg  Lue regular cuff (lower arm)

 

 Respiratory Rate  12 /min  

 

 O2 % BldC Oximetry  97 %  

 

 BMI (Body Mass Index)  63.7 kg/m2  









 10/12/2018  Height  69 inches  5'9"









 Weight  420.00 lb  

 

 Heart Rate  90 /min  

 

 BP Systolic Sitting  138 mmHg  

 

 BP Diastolic Sitting  78 mmHg  

 

 Respiratory Rate  20 /min  

 

 Body Temperature  98.2 F  

 

 BMI (Body Mass Index)  62.0 kg/m2  









 2018  Height  69 inches  5'9"









 Weight  433.50 lb  

 

 Heart Rate  92 /min  

 

 BP Systolic Sitting  126 mmHg  Lue large cuff

 

 BP Diastolic Sitting  86 mmHg  Lue large cuff

 

 Respiratory Rate  20 /min  

 

 O2 % BldC Oximetry  93 %  On Ra

 

 BMI (Body Mass Index)  64.0 kg/m2  

 

 Neck Circumference in inches  19.5  









 2018  Height  69 inches  5'9"









 Weight  436.00 lb  

 

 Heart Rate  84 /min  

 

 BP Systolic Sitting  126 mmHg  Rue lg cuff

 

 BP Diastolic Sitting  80 mmHg  Rue lg cuff

 

 BMI (Body Mass Index)  64.4 kg/m2  

 

 Ejection Fraction  55-60%  Echo 7/10/18









 2018  Height  69 inches  5'9"









 Weight  435.25 lb  

 

 Heart Rate  88 /min  

 

 BP Systolic Sitting  132 mmHg  lae lg cuff

 

 BP Diastolic Sitting  62 mmHg  lae lg cuff

 

 BP Systolic Standing  128 mmHg  la repEAT Sitting

 

 BP Diastolic Standing  64 mmHg  la repEAT Sitting

 

 Respiratory Rate  18 /min  

 

 BMI (Body Mass Index)  64.3 kg/m2  

 

 Ejection Fraction  50%  5/15/2018









 2014  Height  69 inches  5'9"









 Weight  447.75 lb  with shoes

 

 Heart Rate  96 /min  

 

 BP Systolic Sitting  110 mmHg  LA lg cuff

 

 BP Diastolic Sitting  86 mmHg  LA lg cuff

 

 Respiratory Rate  17 /min  

 

 BMI (Body Mass Index)  66.1 kg/m2  









 2014  Height  69 inches  5'9"









 Weight  452.00 lb  

 

 Heart Rate  97 /min  

 

 BP Systolic Sitting  122 mmHg  

 

 BP Diastolic Sitting  85 mmHg  

 

 Respiratory Rate  20 /min  

 

 Body Temperature  99.3 F  

 

 BMI (Body Mass Index)  66.7 kg/m2  







Results







 Test  Date  Test  Result  H/L  Range  Note

 

 Laboratory test  10/12/2018  Surgical Pathology  SEE RESULT BELOW      1



 finding            

 

 Lipid Panel - [facility]  06/15/2018  Creatine Kinase(CK)  38 U/L      

 

 Laboratory test  06/15/2018  Hemoglobin A1c  8.3 %  High  4.0-5.6  2



 finding    (Glyco HGB)        

 

 Comp Metabolic Panel  06/15/2018  Sodium  136 mmol/L    135-145  









 Potassium  4.3 mmol/L    3.5-5.0  

 

 Chloride  100 mmol/L  Low  101-111  

 

 Co2 Carbon Dioxide  27 mmol/L    22-32  

 

 Anion Gap  9 mmol/L    2-11  

 

 Glucose  228 mg/dL  High    

 

 Blood Urea Nitrogen  9 mg/dL    6-24  

 

 Creatinine  0.62 mg/dL  Low  0.67-1.17  

 

 BUN/Creatinine Ratio  14.5    8-20  

 

 Calcium  9.2 mg/dL    8.6-10.3  

 

 Total Protein  6.6 g/dL    6.4-8.9  

 

 Albumin  3.8 g/dL    3.2-5.2  

 

 Globulin  2.8 g/dL    2-4  

 

 Albumin/Globulin Ratio  1.4    1-3  

 

 Total Bilirubin  0.70 mg/dL    0.2-1.0  

 

 Alkaline Phosphatase  40 U/L      

 

 Alt  26 U/L    7-52  

 

 Ast  21 U/L    13-39  

 

 Egfr Non-  141.6    >60  

 

 Egfr   182.1    >60  3









 Urine Microalbumin Random  06/15/2018  Ur Microalbumin (mg/L)  < 15.0 mg/L    
  









 Urine Creatinine  95.45 mg/dL      

 

 Urine Microalbumin/Creatinine  TNP ug/mg    <31  4









 Laboratory test finding  06/15/2018  B-Type Natriuretic Peptide BNP  6 pg/mL  
    5









 Magnesium  1.8 mg/dL  Low  1.9-2.7  









 CBC W/Auto Diff  06/15/2018  White Blood Count  6.0 10^3/uL    3.5-10.8  









 Red Blood Count  5.55 10^6/uL  High  4.00-5.40  

 

 Hemoglobin  16.3 g/dL    14.0-18.0  

 

 Hematocrit  48 %    42-52  

 

 Mean Corpuscular Volume  86 fL    80-94  

 

 Mean Corpuscular Hemoglobin  30 pg    27-31  

 

 Mean Corpuscular HGB Conc  34 g/dL    31-36  

 

 Red Cell Distribution Width  15 %    10.5-15  

 

 Platelet Count  245 10^3/uL    150-450  

 

 Mean Platelet Volume  8.0 um3    7.4-10.4  

 

 Abs Neutrophils  3.0 10^3/uL    1.5-7.7  

 

 Abs Lymphocytes  2.3 10^3/uL    1.0-4.8  

 

 Abs Monocytes  0.5 10^3/uL    0-0.8  

 

 Abs Eosinophils  0.2 10^3/uL    0-0.6  

 

 Abs Basophils  0 10^3/uL    0-0.2  

 

 Abs Nucleated RBC  0 10^3/uL      

 

 Granulocyte %  50.4 %    38-83  

 

 Lymphocyte %  37.8 %    25-47  

 

 Monocyte %  7.6 %  High  0-7  

 

 Eosinophil %  3.7 %    0-6  

 

 Basophil %  0.5 %    0-2  

 

 Nucleated Red Blood Cells %  0.2      









 Lipid Panel  06/15/2018  Triglycerides  71 mg/dL      6









 Cholesterol  103 mg/dL      7

 

 HDL Cholesterol  39.9 mg/dL      8

 

 LDL Cholesterol  49 mg/dL      9









 CBC No Diff  2014  White Blood Count  7.8 10^3/uL    4.8-10.8  









 Red Blood Count  5.36 10^6/uL    4.0-5.4  

 

 Hemoglobin  15.3 g/dL    14.0-18.0  

 

 Hematocrit  45 %    42-52  

 

 Mean Corpuscular Volume  85 fL    80-94  

 

 Mean Corpuscular Hemoglobin  28 pg    27-31  

 

 Mean Corpuscular HGB Conc  34 g/dL    31-36  

 

 Red Cell Distribution Width  15 %    10.5-15  

 

 Platelet Count  316 10^3/uL    150-450  

 

 Mean Platelet Volume  8 um3    7.4-10.4  









 Comp Metabolic Panel  2014  Sodium  136 mmol/L    133-145  









 Potassium  4.4 mmol/L    3.7-5.6  

 

 Chloride  96 mmol/L  Low  101-111  

 

 Co2 Carbon Dioxide  33 mmol/L  High  22-32  

 

 Anion Gap  7 mmol/L    2-11  

 

 Glucose  97 mg/dL      

 

 Blood Urea Nitrogen  11 mg/dL    6-24  

 

 Creatinine  0.64 mg/dL  Low  0.67-1.17  

 

 BUN/Creatinine Ratio  17.2    8-20  

 

 Calcium  9.1 mg/dL    8.6-10.3  

 

 Total Protein  7.1 g/dL    6.4-8.9  

 

 Albumin  3.6 g/dL    3.2-5.2  

 

 Globulin  3.5 g/dL    2-4  

 

 Albumin/Globulin Ratio  1.0    1-3  

 

 Total Bilirubin  0.40 mg/dL    0.2-1.0  

 

 Alkaline Phosphatase  46 U/L      

 

 Alt  17 U/L    7-52  

 

 Ast  13 U/L    13-39  

 

 Egfr Non-  139.2    >60  

 

 Egfr   179.1    >60  10









 Laboratory test finding  2014  C Reactive Protein  37.06 mg/L  High  < 
5.00  11









 1  SEE RESULT BELOW



   -----------------------------------------------------------------------------
---------------



   Name:  STEVEN PAULA              : 1975    Attend Dr: Mati López MD



   Acct:  [id number]  Unit: [id number]  AGE: 42            Location:  [facility]



   Reg:   10/12/18                        SEX: M             Status:    REG REF



   -----------------------------------------------------------------------------
---------------



   



   SPEC: [id number]            JENNIFER: 10/12/      [facility] DR: Mati López MD



   REQ:  [id number]             RECD: 10/12/



   STATUS: SOUT



   _



   ORDERED:  LEVEL 4/2



   COMMENTS: [id number]



   



   FINAL DIAGNOSIS



   



   



   1.  Skin, right anterior lower leg, punch biopsy:



   -- Cavernous hemangioma.



   



   2.   Skin, left posterior lower leg, punch biopsy:



   -- Cavernous hemangioma.



   



   



   -----------------------------------------------------------------------------
---------------



   



   



   CLINICAL HISTORY



   



   No history given



   



   



   GROSS DESCRIPTION



   



   1.   The specimen is received in formalin labeled, Right Anterior Lower Leg 
Lesion, and



   consists of a 0.6 cm tan-pink scaly to friable circular skin punch excised 
to a depth of 0.5



   cm predominantly surfaced by a 0.6 x 0.5 x 0.4 cm tan-pink to black scaly 
keratotic nodule.



   The specimen is bisected and submitted entirely in one cassette.



   



   2.   The specimen is received in formalin labeled, Left Posterior Lower Leg 
Lesion, and



   consists of a 0.7 x 0.6 cm tan-white scaly bulbous ovoid skin punch excised 
to a depth of



   0.5 cm which is bisected and submitted entirely in one cassette.



   



   Signed by and Reported on: __________              Evens Mcghee MD 10/
15/18 1209



   



   -----------------------------------------------------------------------------
---------------



   



   



   



   



   ** END OF REPORT **



   



   DEPARTMENT OF PATHOLOGY,  [address] [place] [address]



   Phone # [phone]      Fax #[phone]



   Evens Mcghee M.D. Director     [facility] # [id number]

 

 2  Therapeutic target for the treatment of diabetes



   mellitus patients is <7% HBA1C, and in selective



   patients <6.0%.  Please refer to American Diabetes



   Association diabetic care guidelines for further



   information.

 

 3  *******Because ethnic data is not always readily available,



   this report includes an eGFR for both -Americans and



   non- Americans.****



   The National Kidney Disease Education Program (NKDEP) does



   not endorse the use of the MDRD equation for patients that



   are not between the ages of 18 and 70, are pregnant, have



   extremes of body size, muscle mass, or nutritional status,



   or are non- or non-.



   According to the National Kidney Foundation, irrespective of



   diagnosis, the stage of the disease is based on the level of



   kidney function:



   Stage Description                      GFR(mL/min/1.73 m(2))



   1     Kidney damage with normal or decreased GFR       90



   2     Kidney damage with mild decrease in GFR          60-89



   3     Moderate decrease in GFR                         30-59



   4     Severe decrease in GFR                           15-29



   5     Kidney failure                       <15 (or dialysis)

 

 4  Unable to calculate due to low microalbumin

 

 5  >100 to <200 pg/mL: likely compensated congestive heart



   failure (CHF)



   200 to 400 pg/mL: likely moderate CHF



   >400 pg/mL: likely moderate to severe CHF

 

 6  Desirable: <150



   Borderline High: 150-199



   High: 200-499



   Very High: >500

 

 7  Desirable: <200



   Borderline High: 200-239



   High: >239

 

 8  Low: <40



   Desirable: 40-60



   High: >60

 

 9  Desirable: <100



   Near Optimal: 100-129



   Borderline High: 130-159



   High: 160-189



   Very High: >189

 

 10  *******Because ethnic data is not always readily available,



   this report includes an eGFR for both -Americans and



   non- Americans.****



   The National Kidney Disease Education Program (NKDEP) does



   not endorse the use of the MDRD equation for patients that



   are not between the ages of 18 and 70, are pregnant, have



   extremes of body size, muscle mass, or nutritional status,



   or are non- or non-.



   According to the National Kidney Foundation, irrespective of



   diagnosis, the stage of the disease is based on the level of



   kidney function:



   Stage Description                      GFR(mL/min/1.73 m(2))



   1     Kidney damage with normal or decreased GFR       90



   2     Kidney damage with mild decrease in GFR          60-89



   3     Moderate decrease in GFR                         30-59



   4     Severe decrease in GFR                           15-29



   5     Kidney failure                       <15 (or dialysis)

 

 11  Acute inflammation:  >10.00







Procedures







 Date  CPT Code  Description  Status

 

 10/12/2018  00951  Each Additional Biopsy  Completed

 

 10/12/2018  99377  Biopsy Skin Lesion Single  Completed

 

 2018  20585  Stress ECHO Interpretation/Report Hospital  Completed

 

 2018  45803  Treadmill Interp/Report Only  Completed

 

 2018  41878  Stress Test Supervsn W/Out I/R  Completed

 

 07/10/2018  68887  Echocardiogram, Limited Study  Completed

 

 2018  99212  Diffusing Capacity  Completed

 

 2018  50721  Plethysmography Determination Lung Volumes & Per Airway  
Completed



     Resist  

 

 2018  45702  Pulmonary Function><Bronchodil  Completed

 

 2018  43048  EKG Tracing & Interpretation  Completed

 

 05/15/2018  85555  ECHO Transthorasic Realtime 2D W Doppler & Color Flow  
Completed



     Hosp  

 

 2015  12298  Echocardiogram, Limited Study  Completed

 

 10/20/2014  69192  Holter Monitoring 24 HR New  Completed

 

 2014  60940  EKG Tracing & Interpretation  Completed

 

 2014  59645  EKG, Interpretation Only  Completed

 

 2014  83680  Color Flow Doppler/Interp & Reprt  Completed

 

 2014  51801  Pulse Wave/Continuous-Interp.RPT  Completed

 

 2014  69125  Echocardiography, Transesophageal, Real Time W/Image 2D  
Completed



     W/W/O M-M  







Encounters







 Type  Date  Location  Provider  CPT E/M  Dx

 

 Office Visit  2018  [facility]  Charisse Da Silva MD  [address]  
R06.83



   11:00a  [facility]      









 G47.33

 

 E66.01

 

 F17.210

 

 Z68.44









 Office Visit  2018 11:30a  [facility]  Aixa Green, N.P.  
[address]  I50.23









 F17.210

 

 I42.9

 

 E66.01

 

 E11.9

 

 R94.31









 Office Visit  2018  3:00p  [facility]  Young Rao,  [address]
  I50.23



       M.D.    









 E66.01

 

 G47.33

 

 I50.9

 

 I42.9

 

 E11.9

 

 R94.31









 Office Visit  2018  3:03p  [facility],yoko Kennedy MD  [address]
  I50.23



     Hospitalists      









 G47.33

 

 E66.01

 

 Z68.45









 Office Visit  05/15/2018  3:02p  [facility],yoko Paredes DO  
[address]  I50.23



     Hospitalists      









 G47.33

 

 R79.89

 

 Z68.45









 Office Visit  2018  2:59p  [facility],yoko Paredes DO  
[address]250  I50.23



     Hospitalists      









 G47.33

 

 R79.89

 

 Z68.45









 Office Visit  2014  9:30a  [facility]  LEANDRO Mckeon  [address]  
425.4









 278.01

 

 790.7

 

 799.02

 

 327.23









 Office Visit  2014  2:40p  [facility]  Silvestre Pascal,  
[address]906  682.9



     Infectious Diseases  M.D.    

 

 Office Visit  2014 10:56a  [facility]  Silvestre Pascal,  
86329  041.02



     Infectious Diseases  M.D.    









 790.7

 

 995.91

 

 682.6

 

 719.41

 

 278.00

 

 425.4









 Office Visit  2014  1:38p  [facility]  Young Rao,  [address]
  425.4



       M.D.    

 

 Office Visit  2014  2:59p  [facility],  Kimberly Lebron,  
86755  038.9



     Hospitalists  M.D.    









 682.9

 

 278.01

 

 719.41









 Office Visit  2014  2:58p  [facility],  Kimberly Lebron,  
[address]67  038.9



     Hospitalists  M.D.    









 682.9

 

 278.01

 

 719.41









 Office Visit  2014  2:58p  [facility],  Kimberly Vi,  
51608  038.9



     Hospitalists  M.D.    









 682.9

 

 278.01

 

 719.41









 Office Visit  2014  2:57p  [facility],  Kimberly Vi,  
[address]87  682.9



     Hospitalists  M.D.    









 038.9

 

 719.41

 

 278.01









 Office Visit  2014  9:30a  [facility]  Silvestre Pascal,  
12158  041.02



     Infectious Diseases  M.D.    









 790.7

 

 682.6

 

 719.41

 

 278.00









 Office Visit  2014  2:57p  [facility],  Aixa Green,  
[address]34  038.9



     Hospitalists  N.P.    









 682.9

 

 278.01

 

 486







Plan of Care

Future Appointment(s):2018  8:30 am - Nannette Peres DNP, RN, FNP-BC at 
[facility]10/ - Nannette Peres DNP, RN, FNP-
BCG47.33 Obstructive sleep apnea (adult) (pediatric)New Orders:Sleep 
StudyFollow up:2 monthsRecommendations:Review of sleep study in detail. Concern 
related to obesity hypoventilation syndrome and need for other PAP devices, 
recommend in-lab PAP titration. Needs day study 9AM-4 PM (sleep time)   Review 
of risks of untreated sleep apnea including cardiovascular events: rhythm 
irregularities, heart attack, stroke; gastro esophageal reflux disease (GERD); 
diabetes; anxiety, depression; high blood pressure; accidents (machinery and 
automobile) Recommendation for PAP other treatment modalities NON-PAP including 
oral appliance/mandibular advancement device, positional strategies , and 
surgery discussed.  Referral for PAP device to [facility] to  [facility]    
                                          phone: [phone] Equipment 
appointment will take about 45 minutes, the DME provider will call you within 5 
days to set you up for the device.  If you do not hear from them call the Sleep 
Center. The mask will have a 30-day guarantee, if you have mask problems call 
the DME provider to have a fitting for adifferent mask. Need distilled water 
for humidifier CPAP mask and tubing Cleaning Wipe off mask daily (baby wipe-no 
scent, or warm water) Clean mask, tubing, filter, and water chamber weekly in 
mild noscent dish soap and water. Hang to dry.   So-Clean is an option (not 
covered by insurance) If you have problems with the air pressure call the sleep 
center and speak to a nurse.   If you have any sleepiness while driving you 
MUST avoid operating a vehicle or machinery.   If you have any further questions
, please call the [facility] at [phone] Nicotine 
dependence, cigarettes, uncomplicatedRecommendations:Recommend qkhzggceR17.2 
Morbid (severe) obesity with alveolar hypoventilationNew Labs:Arterial Blood 
GasRecommendations:recommend lab after you wake up from sleep. THus have it 
drawn at 430 or 5 PM.J35.1 Hypertrophy of tonsilsReferral:Donnell De Leon MD
, OtolaryngologyRecommendations:white  tissue on the left tonsil recommend 
upper airway exam due to tobacco user.Z68.44 Body mass index (BMI) 60.0-69.9, 
adultNew Labs:Arterial Blood GasRecommendations:Recommend weight loss, consider 
the medically supervised weight loss program or bariatric surgery Possible 
obesity hypoventilation syndrome

## 2018-11-04 NOTE — HP
*** AMENDED REPORT NOW INCLUDES COSIGNER DESIGNATION ***



CC:  Dr. Hari Fulton.*

 

HISTORY AND PHYSICAL:

 

DATE OF ADMISSION:  18

 

PRIMARY CARE PROVIDER:  Dr. Hari Fulton.

 

CARDIOLOGIST:  Dr. Rao.

 

ATTENDING PHYSICIAN:  Dr. Shawn Aguilar * (dictated by Aida Devlin NP).

 

CHIEF COMPLAINT:

1.  Low back pain, radiating to the right testicle and right leg.

2.  Right leg swelling and redness.

 

HISTORY OF PRESENT ILLNESS:  Mr. Motta is a 42-year-old male with past 
medical history of diabetes mellitus, type 2; CHF; obstructive sleep apnea; and 
morbid obesity, who presents to the emergency room today with complaints of low 
back pain, right leg pain and right leg swelling and redness.  He reports that 
he began having right back and testicle pain on 18, and the same day, he 
had multiple episodes of emesis and he had a fever up to 102.  He continued to 
experience pain, though today he noted that his right leg was significantly red 
and swollen.  He reports having cellulitis multiple times in the past, at least 
one of those times, he developed sepsis.  There are no changes in appetite, no 
chest pain, no shortness of breath, no cough, no dysuria or urinary frequency, 
and no neuro deficits.  He is able to walk on the legs, although he does report 
pain while ambulating. The patient reports having two skin tags removed recently
, one on the posterior left leg and another on the right shin.  These wounds 
have not completely healed, though the patient and his wife have noted any 
signs of infection prior to today.

 

While in the emergency room today, the patient was noted to have a normal white 
blood count, although had an elevated lactic acid.  He was given IV fluids and 
received a dose of cefepime and a dose of vanco.  He had a chest x-ray, which 
was essentially unremarkable, and an EKG, which was unremarkable.  Because of 
the concern for cellulitis, the hospitalist service was asked to evaluate for 
admission.

 

PAST MEDICAL HISTORY:

1.  Diabetes mellitus, type 2.

2.  Congestive heart failure.

3.  Obstructive sleep apnea.

4.  Morbid obesity.

 

PAST SURGICAL HISTORY:  None.

 

HOME MEDICATIONS:

1.  Albuterol MDI 2 puffs q.4 hours p.r.n.

2.  Aspirin 81 mg p.o. daily.

3.  Atorvastatin 40 mg p.o. daily.

4.  Furosemide 20 mg p.o. 5 days per week.

5.  Glimepiride 8 mg p.o. daily.

6.  Lisinopril 10 mg p.o. daily.

7.  Metformin 1000 mg p.o. b.i.d.

8.  Metoprolol succinate 25 mg p.o. daily.

 

ALLERGIES:  No known drug allergies.

 

FAMILY HISTORY:  Mother is alive and has a medical history remarkable for 
cervical cancer.  Father  at the age 84 due to a CVA.  He denies any 
further family history of coronary artery disease, diabetes, or cancer.

 

SOCIAL HISTORY:  The patient has a 30-pack year smoking history.  He denies any 
alcohol use. He reports using marijuana daily.  He works as a manager at a taxi 
company.  The patient's wife, Jeannie will be his surrogate decision maker in the 
event he is unable to make his own decisions.  Her phone number is [phone].

 

REVIEW OF SYSTEMS:  An 11-point review of systems was performed and all the 
pertinent positive and negative findings are in the HPI.  All other systems are 
negative.

 

                               PHYSICAL EXAMINATION

 

GENERAL:  Mr. Motta is a well-developed, well-nourished, obese, white male, 
sitting up in bed, in no acute distress.  He appears his stated age.

 

VITAL SIGNS: Temp 96.9, heart rate 94, respiratory rate 16, oxygen saturation 93
% on room air, blood pressure 152/94.

 

HEENT:  Head is atraumatic, normocephalic.  Visual fields are grossly intact. 
Pupils are equal, round, and reactive to light and accommodation.  Oral mucous 
membranes are moist and without lesions.

 

NECK:  Full range of motion.  Thyroid not palpable.  Trachea at midline.  No 
lymphadenopathy.

 

RESPIRATORY:  Symmetrical chest expansion.  No chest wall deformities.  Lungs 
with mild inspiratory wheezes throughout.  No rhonchi, or rubs.

 

CARDIOVASCULAR:  Regular rate and rhythm.  S1, S2 present.  No murmurs, rubs, 
or gallops.  No JVD.

 

ABDOMEN:  Soft, nontender to palpation.  Bowel sounds normoactive throughout.  
No bruits appreciated.

 

EXTREMITIES:  Skin warm with notable brown discoloration to the left lower 
extremity.  The right lower extremity has mild nonpitting edema with erythema 
extending from the groin to the ankle.  No clubbing or cyanosis.  Pedal pulses 
are 2+ bilaterally.

 

MUSCULOSKELETAL:  Full range of motion.  No pain or deformity.

 

NEURO:  Awake, alert, and oriented x4.  Cranial nerves II through XII are 
grossly intact.  He moves all extremities.

 

SKIN:  As noted above, there is significant erythema from the right groin to 
the right ankle which is tender to the touch.  On the right anterior lower leg, 
there is approximately 2 cm x 2 cm healing wound.  No purulent drainage.

 

DIAGNOSTIC STUDIES/LABORATORY DATA:  WBC 8.3, RBC 5.36, hemoglobin 16, 
hematocrit 47, platelets 223.  INR is 1.00.  Sodium 132, potassium 4.0, 
chloride 99, carbon dioxide 24, BUN 10, creatinine 0.61, glucose 413, lactic 
acid 3.0.  Troponin 0.00. .  Urinalysis is pending.

 

EKG shows normal sinus rhythm at a rate of 94.  No ST changes or other ischemic 
changes.

 

Chest x-ray shows no active cardiopulmonary disease to my read, though the 
image is of poor quality due to body habitus.

 

ASSESSMENT AND PLAN:  Mr. Motta is a 42-year-old male with past medical 
history of diabetes, congestive heart failure, obstructive sleep apnea, and 
morbid obesity, who presents to the emergency room today with right leg edema 
and erythema, and was found to have right lower extremity cellulitis.  The 
patient will be admitted inpatient for:

 

1.  Right lower extremity cellulitis.  The cellulitis extends from the patient'
s groin to his ankle.  We will check an ultrasound to rule out DVT, though I 
think DVT is unlikely.  I suspect that the cellulitis resulted from his recent 
skin tag removal, although that wound is dry and cannot be cultured.  The 
patient has a normal white blood count and has been afebrile.  His lactic acid 
is elevated, and he is being given 3 liters of normal saline in the emergency 
room.  Lactic will be rechecked around 20:00.  The patient was given cefepime 
and vancomycin in the emergency room, and I will continue these.  The 
vancomycin will be per Pharmacy protocol.

2.  Diabetes.  The patient's glucose is markedly elevated in the emergency 
room. He has been given 14 units of regular insulin by the emergency room 
physician.  We will continue to his monitor his blood sugar a.c. and h.s.  He 
will receive sliding scale lispro.  I will hold his glimepiride and metformin 
at this point.

3.  Congestive heart failure.  The patient does not appear to be in 
exacerbation at this time.  We will check daily weights and strict intake and 
output.  He can continue his furosemide, metoprolol, and lisinopril.

4.  Obstructive sleep apnea.  The patient does not currently use the CPAP, but 
reports that he is in the process of being set up for a CPAP.  I will not place 
him on CPAP at this point since he has not started home therapy.

5.  Morbid obesity.  The patient has a BMI of 63.  Supportive care.

6.  Fluids, electrolytes, and nutrition:  The patient's sodium is slightly low, 
though he is being hydrated in the emergency room with normal saline, otherwise 
he does not require any repletion.  He will be given the 3 liters of fluid in 
the emergency room, and no further fluid after that as he does have a history 
of congestive heart failure.  I have placed him on a consistent carbohydrate 
diet.

7.  Code status:  The patient will be a full code.

8.  DVT prophylaxis:  According to the DVT risk assessment, the patient scores 
a 4, putting him at high risk.  I have placed him on Lovenox.

 

TIME SPENT:  Approximately 60 minutes were spent on this admission, greater 
than half of that time spent with the patient and his wife obtaining my history
, performing my physical exam, and reviewing the plan of care.

 

The case has been reviewed with my attending, Dr. Aguilar, who is in agreement 
with the plan of care.

 

 ____________________________________ AIDA DEVLIN NP

 

[id number]/CPS #: [id number]

JESS

## 2018-11-04 NOTE — RAD
INDICATION: Sepsis



COMPARISON: None.

 

TECHNIQUE: Single AP portable view of the chest was obtained.



FINDINGS: 



Image quality is compromised due to the relative inferiority of a portable chest x-ray.



The heart and mediastinum exhibit normal size and contour.



There are hazy densities overlying the bilateral lungs. This could be due to pulmonary

edema or simply the patient's large body habitus preventing adequate penetration of the

x-ray beam.



Visualized bones are normal for the patient's age.



IMPRESSION:  Hazy densities overlying the bilateral lungs could be due to pulmonary edema,

pneumonitis or simply be the consequence of the patient's body habitus preventing adequate

beam penetration.

## 2018-11-05 LAB
BASOPHILS # BLD AUTO: 0 10^3/UL (ref 0–0.2)
EOSINOPHIL # BLD AUTO: 0.2 10^3/UL (ref 0–0.6)
HCT VFR BLD AUTO: 42 % (ref 42–52)
HGB BLD-MCNC: 14.6 G/DL (ref 14–18)
LYMPHOCYTES # BLD AUTO: 3.4 10^3/UL (ref 1–4.8)
MCH RBC QN AUTO: 30 PG (ref 27–31)
MCHC RBC AUTO-ENTMCNC: 35 G/DL (ref 31–36)
MCV RBC AUTO: 86 FL (ref 80–94)
MONOCYTES # BLD AUTO: 0.6 10^3/UL (ref 0–0.8)
NEUTROPHILS # BLD AUTO: 3.5 10^3/UL (ref 1.5–7.7)
NRBC # BLD AUTO: 0 10^3/UL
NRBC BLD QL AUTO: 0.1
PLATELET # BLD AUTO: 199 10^3/UL (ref 150–450)
RBC # BLD AUTO: 4.92 10^6/UL (ref 4–5.4)
WBC # BLD AUTO: 7.7 10^3/UL (ref 3.5–10.8)

## 2018-11-05 RX ADMIN — LISINOPRIL SCH MG: 10 TABLET ORAL at 08:55

## 2018-11-05 RX ADMIN — METFORMIN HYDROCHLORIDE SCH MG: 1000 TABLET, FILM COATED ORAL at 22:30

## 2018-11-05 RX ADMIN — VANCOMYCIN HYDROCHLORIDE SCH: 1 INJECTION, POWDER, LYOPHILIZED, FOR SOLUTION INTRAVENOUS at 12:03

## 2018-11-05 RX ADMIN — OXYCODONE HYDROCHLORIDE AND ACETAMINOPHEN PRN TAB: 5; 325 TABLET ORAL at 02:22

## 2018-11-05 RX ADMIN — INSULIN LISPRO SCH UNIT: 100 INJECTION, SOLUTION INTRAVENOUS; SUBCUTANEOUS at 08:54

## 2018-11-05 RX ADMIN — ENOXAPARIN SODIUM SCH MG: 40 INJECTION SUBCUTANEOUS at 18:00

## 2018-11-05 RX ADMIN — VANCOMYCIN HYDROCHLORIDE SCH MLS/HR: 1 INJECTION, POWDER, LYOPHILIZED, FOR SOLUTION INTRAVENOUS at 02:59

## 2018-11-05 RX ADMIN — VANCOMYCIN HYDROCHLORIDE SCH MLS/HR: 1 INJECTION, POWDER, LYOPHILIZED, FOR SOLUTION INTRAVENOUS at 17:56

## 2018-11-05 RX ADMIN — INSULIN LISPRO SCH UNIT: 100 INJECTION, SOLUTION INTRAVENOUS; SUBCUTANEOUS at 22:30

## 2018-11-05 RX ADMIN — METOPROLOL SUCCINATE SCH MG: 25 TABLET, EXTENDED RELEASE ORAL at 08:55

## 2018-11-05 RX ADMIN — INSULIN LISPRO SCH UNIT: 100 INJECTION, SOLUTION INTRAVENOUS; SUBCUTANEOUS at 12:10

## 2018-11-05 RX ADMIN — CEFEPIME HYDROCHLORIDE SCH MLS/HR: 1 INJECTION, SOLUTION INTRAVENOUS at 08:54

## 2018-11-05 RX ADMIN — NICOTINE SCH PATCH: 21 PATCH TRANSDERMAL at 15:49

## 2018-11-05 RX ADMIN — ASPIRIN SCH MG: 81 TABLET, CHEWABLE ORAL at 08:55

## 2018-11-05 RX ADMIN — INSULIN LISPRO SCH UNITS: 100 INJECTION, SOLUTION INTRAVENOUS; SUBCUTANEOUS at 22:31

## 2018-11-05 RX ADMIN — VANCOMYCIN HYDROCHLORIDE SCH MLS/HR: 1 INJECTION, POWDER, LYOPHILIZED, FOR SOLUTION INTRAVENOUS at 23:15

## 2018-11-05 RX ADMIN — CEFEPIME HYDROCHLORIDE SCH MLS/HR: 1 INJECTION, SOLUTION INTRAVENOUS at 22:00

## 2018-11-05 RX ADMIN — VANCOMYCIN HYDROCHLORIDE SCH MLS/HR: 1 INJECTION, POWDER, LYOPHILIZED, FOR SOLUTION INTRAVENOUS at 12:11

## 2018-11-05 RX ADMIN — INSULIN LISPRO SCH UNIT: 100 INJECTION, SOLUTION INTRAVENOUS; SUBCUTANEOUS at 17:24

## 2018-11-05 RX ADMIN — INSULIN LISPRO SCH UNITS: 100 INJECTION, SOLUTION INTRAVENOUS; SUBCUTANEOUS at 17:24

## 2018-11-05 NOTE — PN
Subjective


Date of Service: 11/05/18


Interval History: 





Mr. Motta reports feeling better today. He states that his pain has 

decreased in his leg, though he is still having significant right back and hip 

pain. Toradol was effective at reducing his pain. Him and his wife feel as 

though the erythema has improved since yesterday. The area is much less tender 

to palpation. He is frustrated because of his IV pump beeping, though he denies 

further complaints. Denies CP, SOB, N/V/D, dizziness.





Family History: Unchanged from Admission


Social History: Unchanged from Admission


Past Medical History: Unchanged from Admission





Objective


Active Medications: 





Acetaminophen (Tylenol Tab*)  650 mg PO Q4H PRN


Al Hydrox/Mg Hydrox/Simethicone (Maalox Plus*)  30 ml PO Q6H PRN


Albuterol (Ventolin 2.5 Mg/3 Ml Neb.Sol*)  2.5 mg INH [id number] AWAKE PRN


Albuterol (Ventolin Hfa Inhaler*)  2 puff INH Q4HR PRN


Aspirin (Aspirin 81 Mg Chew Tab*)  81 mg PO DAILY OSCAR


Atorvastatin Calcium (Lipitor*)  40 mg PO 1700 OSCAR


Dextrose (D50w Syringe 50 Ml*)  12.5 gm IV PUSH .FOR FS < 60 - SS PRN


Docusate Sodium (Colace Cap*)  100 mg PO BID PRN


Enoxaparin Sodium (Lovenox(*))  40 mg SUBCUT Q24H OSCAR


Furosemide (Lasix Tab*)  20 mg PO .5 TIMES A WEEK OSCAR


Cefepime HCl (Maxipime 1 Gm In Dextrose Duplex (*))  1 gm in 50 mls @ 100 mls/

hr IV Q12H OSCAR


Vancomycin HCl 1,000 mg/ (Sodium Chloride)  250 mls @ 166.667 mls/hr IVPB Q6HR 

OSCAR


Insulin Human Lispro (Humalog*)  0 units SUBCUT ACHS OSCAR; Protocol


Insulin Human Lispro (Humalog*)  3 units SUBCUT ACHS OSCAR


Lisinopril (Prinivil Tab*)  10 mg PO DAILY OSCAR


Metformin HCl (Glucophage*)  1,000 mg PO BID OSCAR


Metoprolol Succinate (Toprol Xl Tab*)  25 mg PO DAILY OSCAR


Nicotine (Nicotine Patch 21 Mg/24 Hr*)  1 patch TRANSDERM DAILY@0800 OSCAR


Ondansetron HCl (Zofran Inj*)  4 mg IV Q4H PRN


Oxycodone/Acetaminophen (Percocet 5/325 Tab*)  1 tab PO Q4H PRN


Pharmacy Consult (Vancomycin Per Pharmacy*)  1 note FOLLOW UP .VANC PER 

PHARMACY [facility]


Pharmacy Profile Note (Vancomycin Trough Check)  1 note FOLLOW UP ONCE ONE


Pharmacy Profile Note (Nicotine Patch Removal Note*)  1 note PATCH OFF 2100 [facility]


Senna (Senokot Tab*)  1 tab PO BID PRN





 Vital Signs - 8 hr











  11/05/18 11/05/18





  08:57 11:58


 


Temperature 98.6 F 97.7 F


 


Pulse Rate 84 78


 


Respiratory 19 20





Rate  


 


Blood Pressure 127/77 112/56





(mmHg)  


 


O2 Sat by Pulse 94 93





Oximetry  











Oxygen Devices in Use Now: None


Appearance: Middle-aged male laying in bed in NAD


Eyes: No Scleral Icterus


Ears/Nose/Mouth/Throat: Mucous Membranes Moist


Neck: NL Appearance and Movements; NL JVP


Respiratory: Symmetrical Chest Expansion and Respiratory Effort, Clear to 

Auscultation


Cardiovascular: NL Sounds; No Murmurs; No JVD, RRR


Abdominal: NL Sounds; No Tenderness; No Distention


Extremities: No Clubbing, Cyanosis


Skin: - - Erythema encompassing most of the anterior RLE; both color and size 

decreased from yesterday; small healing wound to anterior shin


Neurological: Alert and Oriented x 3


Lines/Tubes/Other Access: Clean, Dry and Intact Peripheral IV


Nutrition: Taking PO's


Result Diagrams: 


 11/05/18 06:34





 11/05/18 06:34





Assess/Plan/Problems-Billing


Assessment: 





Mr. Motta is a 43yo with PMH of CHF, DM2, GILLES, and morbid obesity who 

presented to the ED with c/o RLE erythema and pain with associated R back and 

hip pain and was found to have RLE cellulitis.





- Patient Problems


(1) Cellulitis of right leg


Current Visit: Yes   Status: Acute   Priority: High   Code(s): L03.115 - 

CELLULITIS OF RIGHT LOWER LIMB   SNOMED Code(s): 201159089


   Comment: 


- Likely 2/2 skin tag removal as wound is still present


- Clinically improving


- Will check xray of lumbar spine and right hip d/t pain


- Continue vanco, cefepime   





(2) Elevated lactic acid level


Current Visit: Yes   Status: Acute   Priority: High   Code(s): R79.89 - OTHER 

SPECIFIED ABNORMAL FINDINGS OF BLOOD CHEMISTRY   SNOMED Code(s): 5277363


   Comment: 


- On admission 3.0, resolved to 1.6 with IVF


- No evidence of sepsis   





(3) Diabetes mellitus, type 2


Current Visit: Yes   Status: Chronic   Priority: Medium   Comment: 


- Significant hyperglycemia since admission; 240+


- Check Hgb A1C


- Hold glimepiride


- Continue lispro SS and add 3 units AC


- Resume metformin   





(4) Chronic systolic congestive heart failure


Current Visit: Yes   Status: Chronic   Priority: Medium   Code(s): I50.22 - 

CHRONIC SYSTOLIC (CONGESTIVE) HEART FAILURE   SNOMED Code(s): 912149136


   Comment: 


- No acute exacerbation


- Continue lisinopril, metoprolol, lasix   





(5) GILLES (obstructive sleep apnea)


Current Visit: Yes   Status: Chronic   Priority: Medium   Code(s): G47.33 - 

OBSTRUCTIVE SLEEP APNEA (ADULT) (PEDIATRIC)   SNOMED Code(s): 92770001


   Comment: 


- Patient reports he is currently in the process of being fitted for a CPAP   





(6) Morbid obesity with BMI of 60.0-69.9, adult


Current Visit: Yes   Status: Chronic   Priority: Medium   Code(s): E66.01 - 

MORBID (SEVERE) OBESITY DUE TO EXCESS CALORIES; Z68.44 - BODY MASS INDEX (BMI) 

60.0-69.9, ADULT   SNOMED Code(s): 042794913


   Comment: 


- Supportive care   





(7) Full code status


Current Visit: Yes   Status: Acute   Priority: High   Code(s): Z78.9 - OTHER 

SPECIFIED HEALTH STATUS   SNOMED Code(s): 735740765


   





(8) DVT prophylaxis


Current Visit: Yes   Status: Acute   Priority: High   Code(s): EVW9402 -    

SNOMED Code(s): 434202405


   Comment: 


- Lovenox


   


Status and Disposition: 





Observation for continue antibiotics. Anticipate d/c home when medically stable

, likely tomorrow.

## 2018-11-06 VITALS — DIASTOLIC BLOOD PRESSURE: 85 MMHG | SYSTOLIC BLOOD PRESSURE: 142 MMHG

## 2018-11-06 LAB
BASOPHILS # BLD AUTO: 0.1 10^3/UL (ref 0–0.2)
EOSINOPHIL # BLD AUTO: 0.2 10^3/UL (ref 0–0.6)
HCT VFR BLD AUTO: 43 % (ref 42–52)
HGB BLD-MCNC: 14.8 G/DL (ref 14–18)
LYMPHOCYTES # BLD AUTO: 2.9 10^3/UL (ref 1–4.8)
MCH RBC QN AUTO: 30 PG (ref 27–31)
MCHC RBC AUTO-ENTMCNC: 34 G/DL (ref 31–36)
MCV RBC AUTO: 87 FL (ref 80–94)
MONOCYTES # BLD AUTO: 0.5 10^3/UL (ref 0–0.8)
NEUTROPHILS # BLD AUTO: 3.7 10^3/UL (ref 1.5–7.7)
NRBC # BLD AUTO: 0 10^3/UL
NRBC BLD QL AUTO: 0.1
PLATELET # BLD AUTO: 228 10^3/UL (ref 150–450)
RBC # BLD AUTO: 4.96 10^6/UL (ref 4–5.4)
WBC # BLD AUTO: 7.3 10^3/UL (ref 3.5–10.8)

## 2018-11-06 RX ADMIN — INSULIN LISPRO SCH UNIT: 100 INJECTION, SOLUTION INTRAVENOUS; SUBCUTANEOUS at 08:41

## 2018-11-06 RX ADMIN — OXYCODONE HYDROCHLORIDE AND ACETAMINOPHEN PRN TAB: 5; 325 TABLET ORAL at 08:43

## 2018-11-06 RX ADMIN — ASPIRIN SCH MG: 81 TABLET, CHEWABLE ORAL at 08:43

## 2018-11-06 RX ADMIN — METOPROLOL SUCCINATE SCH MG: 25 TABLET, EXTENDED RELEASE ORAL at 08:43

## 2018-11-06 RX ADMIN — METFORMIN HYDROCHLORIDE SCH MG: 1000 TABLET, FILM COATED ORAL at 08:43

## 2018-11-06 RX ADMIN — LISINOPRIL SCH MG: 10 TABLET ORAL at 08:43

## 2018-11-06 RX ADMIN — VANCOMYCIN HYDROCHLORIDE SCH MLS/HR: 1 INJECTION, POWDER, LYOPHILIZED, FOR SOLUTION INTRAVENOUS at 05:09

## 2018-11-06 RX ADMIN — CEFEPIME HYDROCHLORIDE SCH MLS/HR: 1 INJECTION, SOLUTION INTRAVENOUS at 08:44

## 2018-11-06 RX ADMIN — INSULIN LISPRO SCH UNITS: 100 INJECTION, SOLUTION INTRAVENOUS; SUBCUTANEOUS at 08:41

## 2018-11-06 RX ADMIN — NICOTINE SCH PATCH: 21 PATCH TRANSDERMAL at 08:42

## 2018-11-07 NOTE — DS
*** AMENDED REPORT NOW INCLUDES COSIGNER DESIGNATION ***



CC:  Dr. Hari Fulton *

 

DISCHARGE SUMMARY:

 

DATE OF ADMISSION:  11/04/18

 

DATE OF DISCHARGE:  11/06/18

 

PRIMARY CARE PROVIDER:  Dr. Hari Fulton.

 

ATTENDING PHYSICIAN:  Dr. Porfirio Kennedy * (dictated by Aida Devlin NP).

 

PRIMARY DIAGNOSES:

1.  Right leg cellulitis.

2.  Elevated lactic acid.

3.  Back and right hip pain.

 

SECONDARY DIAGNOSES:

1.  Diabetes mellitus, type 2.

2.  Chronic systolic congestive heart failure.

3.  Obstructive sleep apnea.

4.  Morbid obesity.

 

STUDIES WHILE IN THE HOSPITAL:

1.  Chest x-ray on 11/04/18 personally reviewed shows no active cardiopulmonary 
disease, though image quality is poor due to body habitus.

2.  Right hip and pelvis x-ray on 11/05/18 reads as no definite fracture was 
identified, although evaluation is limited due to body habitus.

3.  Lumbar spine x-ray on 11/05/18 reads as no fracture of the lumbar spine is 
noted.

4.  Right lower extremity venous Doppler study on 11/05/18 reads as no evidence 
of deep vein thrombosis.

 

HISTORY OF PRESENT ILLNESS AND HOSPITAL COURSE:  Mr. Garcia is a 42-year-old 
male with past medical history of diabetes mellitus type 2, CHF, obstructive 
sleep apnea, and morbid obesity, who presented to the emergency room on 11/04/
18 with complaints of low back and right hip pain along with right leg swelling 
and redness.  Please see my history and physical for a complete summary of the 
events leading up to this hospitalization, but in short, the patient recently 
had 2 skin tags removed, one of which was located on the anterior right lower 
leg. That was approximately 5 weeks ago.  About 2 days prior to admission, the 
patient noted a fever at home along with emesis.  He began to have right back 
and right hip pain, although on the day of admission, the patient awoke with 
significant right leg swelling and redness and presented to the emergency room.
  In the emergency room, he was noted to have a normal white blood count.  He 
did have an elevated lactic acid, which resolved with IV fluids.  He had a 
chest x-ray as noted above.  He was admitted by the hospitalist service for 
cellulitis.

 

The patient was placed on cefepime and vancomycin, first doses were received in 
the emergency room.  He was ruled out for DVT as noted above.  The cellulitis 
was likely secondary to skin tag removal as there is still a small closed wound 
where the skin tag was removed.  His white blood count remained normal.  Vital 
signs remained stable.  Erythema and edema gradually decreased.  The patient 
continued to complain of right hip and back pain, for which the he was  x-rayed 
and results are noted above.  It is likely that his right hip and back pain are 
related to the pain associated in his right leg and likely decompensation.  His 
blood glucose has been markedly elevated while in the hospital up to 400.  His 
A1c was noted to be 10.7. He is quite hesitant to make any changes in his 
medications.  As of the day of discharge, there is no further edema to the 
right lower extremity either is faint erythema.  The patient denies pain on 
palpation.

 

Mr. Garcia is stable for discharge today.  Vital signs are as follows:  Temp 
97.8, heart rate 86, respiratory rate 20, oxygen saturation 95% on room air, 
blood pressure 142/85.

 

DISCHARGE MEDICATIONS:  

New home medications:

1.  Bactrim /160 mg 1 tab p.o. b.i.d. for 7 days.

2.  Ibuprofen 800 mg p.o. q.6 hours p.r.n. pain.

 

Continued home medications:

1.  Albuterol MDI 2 puffs q.4 hours p.r.n.

2.  Aspirin 81 mg p.o. daily.

3.  Atorvastatin 40 mg p.o. daily.

4.  Glimepiride 8 mg p.o. daily.

5.  Lisinopril 10 mg p.o. daily.

6.  Metformin 1000 mg p.o. b.i.d.

7.  Metoprolol succinate 25 mg p.o. daily.

 

Changed home medications:  

1.  Furosemide 20 mg p.o. daily (previously was 5 days per week).

 

DISCHARGE PLAN:  Mr. Garcia will be discharged home.  Activity will be as 
tolerated.  Diet will be diabetic.  Medications are as noted above.  The 
patient has been prescribed a 7-day course of Bactrim to complete a 10-day 
total course of antibiotic therapy.  He has been prescribed prescription 
strength ibuprofen for continued right hip and back pain.  At this point, he 
should be taking furosemide daily due to the amount of edema in his lower 
extremities.  He should follow up with his primary care provider in 4 to 7 
days.  He has been instructed to return to the emergency room or nearest 
hospital for any worsening of symptoms, shortness of breath, lightheadedness, 
dizziness, chest discomfort, high fevers, chills, night sweats, loss of 
consciousness, or any other worrisome signs or symptoms.

 

This is a summarized report of a complex medical history and hospital stay.  
For further details, please see the entire medical record.

 

TIME SPENT:  Approximately 45 minutes was spent on this discharge, greater than 
half of that time spent face-to-face with the patient discussing discharge 
plans and instructions.

 

____________________________________ AIDA DEVLIN NP

 

[id number]/CPS #: [id number]

JESS

## 2019-12-01 ENCOUNTER — HOSPITAL ENCOUNTER (EMERGENCY)
Dept: HOSPITAL 25 - UCEAST | Age: 44
Discharge: HOME | End: 2019-12-01
Payer: COMMERCIAL

## 2019-12-01 VITALS — DIASTOLIC BLOOD PRESSURE: 78 MMHG | SYSTOLIC BLOOD PRESSURE: 119 MMHG

## 2019-12-01 DIAGNOSIS — E11.9: ICD-10-CM

## 2019-12-01 DIAGNOSIS — I10: ICD-10-CM

## 2019-12-01 DIAGNOSIS — H01.001: Primary | ICD-10-CM

## 2019-12-01 DIAGNOSIS — F17.210: ICD-10-CM

## 2019-12-01 DIAGNOSIS — M25.551: ICD-10-CM

## 2019-12-01 PROCEDURE — G0463 HOSPITAL OUTPT CLINIC VISIT: HCPCS

## 2019-12-01 PROCEDURE — 99212 OFFICE O/P EST SF 10 MIN: CPT

## 2019-12-01 NOTE — UC
Eye Complaint HPI





- HPI Summary


HPI Summary: 


Patient is a 42yo male presenting with R upper eyelid redness, swelling, 

irritation, and "gritty feeling" since last night. Patient states he had mild 

green crusting of the eyelid this morning when he woke up but none since. 

Denies vision changes. Denies redness of the eye. Denies contact lens use. 

Denies pain with movement of eye. Notes feeling of discomfort of the lid when 

he blinks. Patient states he has never had this before. Denies n/v. Denies 

fever and chills.








- History of Current Complaint


Chief Complaint: UCSkin


Stated Complaint: EYE COMPLAINT


Hx Obtained From: Patient


Onset/Duration: Gradual Onset, Lasting Days


Timing: Constant


Severity Currently: Moderate


Pain Intensity: 6


Pain Scale Used: 0-10 Numeric





- Allergies/Home Medications


Allergies/Adverse Reactions: 


 Allergies











Allergy/AdvReac Type Severity Reaction Status Date / Time


 


No Known Drug Allergies Allergy  See Comment Verified 12/01/19 08:42


 


[place] Allergy Severe Swelling Uncoded 12/01/19 08:42














PMH/Surg Hx/FS Hx/Imm Hx


Endocrine History: Diabetes, Dyslipidemia


Cardiovascular History: Hypertension


Other History Of: Anticoagulant Therapy


   Negative For: HIV, Hepatitis B, Hepatitis C





- Surgical History


Surgical History: None





- Family History


Known Family History: Positive: Cardiac Disease, Hypertension, Diabetes, 

Respiratory Disease, Seizure Disorder





- Social History


Alcohol Use: None


Substance Use Type: Marijuana


Substance Use Comment - Amount & Last Used: occasional


Smoking Status (MU): Heavy Every Day Tobacco Smoker


Type: Cigarettes


Amount Used/How Often: avg. 1 PPD


Household Exposure Type: Cigarettes





- Immunization History


Most Recent Influenza Vaccination: never


Most Recent Tetanus Shot: doesn't remember


Most Recent Pneumonia Vaccination: 2017





Review of Systems


All Other Systems Reviewed And Are Negative: No


Constitutional: Positive: Negative.  Negative: Fever, Chills


Eyes: Positive: Other - R upper eyelid swelling, redness, and crusting.  

Negative: Blurred Vision, Diplopia, Drainage, Eye Redness, Photophobia


Respiratory: Positive: Negative.  Negative: Shortness Of Breath


Cardiovascular: Positive: Negative.  Negative: Chest Pain


Gastrointestinal: Positive: Negative.  Negative: Abdominal Pain, Vomiting, 

Diarrhea, Nausea


Genitourinary: Positive: Negative


Musculoskeletal: Positive: Arthralgia - R hip.  Negative: Decreased ROM, Edema


Neurological: Positive: Negative.  Negative: Paresthesia, Numbness





Physical Exam


Triage Information Reviewed: Yes


Appearance: Well-Nourished, Pain Distress


Vital Signs: 


 Initial Vital Signs











Temp  97.9 F   12/01/19 08:44


 


Pulse  96   12/01/19 08:44


 


Resp  18   12/01/19 08:44


 


BP  119/78   12/01/19 08:44


 


Pulse Ox  96   12/01/19 08:44











Vital Signs Reviewed: Yes


Eye Exam: Other - PERRLA. EOM intact


Eyes: Positive: Conjunctiva Clear, Other: - mild edema and erythema noted of R 

upper lid margin. white plugs of meibomian glands present. minimal crusting 

noted.  Negative: Discharge


ENT: Positive: Hearing grossly normal


Neck: Positive: Supple


Respiratory Exam: Normal


Respiratory: Positive: Lungs clear, Normal breath sounds


Cardiovascular Exam: Normal


Cardiovascular: Positive: RRR


Neurological: Positive: Alert


Psychological: Positive: Age Appropriate Behavior





Eye Complaint Course/Dx





- Course


Course Of Treatment: 


Discussed blepharitis with patient. Instructed to use antibiotic ointment at 

night before bed x 1 week. Instructed to apply warm compresses and gently 

massage as well. Instructed to follow up with pcp if symptoms persist. Patient 

voiced understanding and agreed with treatment plan.








- Differential Dx/Diagnosis


Provider Diagnosis: 


 Blepharitis of eyelid of right eye








Discharge ED





- Sign-Out/Discharge


Documenting (check all that apply): Patient Departure


All imaging exams completed and their final reports reviewed: No Studies





- Discharge Plan


Condition: Stable


Disposition: HOME


Prescriptions: 


Erythromycin OPHTH.OINT* [Ilotycin OPHTH.OINT*] 1 applic RIGHT EYE BEDTIME 7 

Days #1 ophth.oint


Patient Education Materials:  Blepharitis (ED)


Referrals: 


Kirstin HUTCHINSON,Hari GARCIA [Primary Care Provider] - If Needed


Additional Instructions: 


As discussed, place the antibiotic ointment along the right eyelid margin once 

daily before bedtime.


Apply warm compresses and gently massage the eyelid 2-3 times daily.


Return or follow up with your PCP if symptoms if symptoms worsen or do not 

resolve within 7 days. 











- Billing Disposition and Condition


Condition: STABLE


Disposition: Home





- Attestation Statements


Provider Attestation: 





I was available for consult. This patient was seen by the JOHANA. The patient was 

not presented to, seen by, or examined by me. -Tati

## 2021-05-19 NOTE — PN
Subjective


Date of Service: 05/16/18


Interval History: 








Pt is without complaint though not sleeping as well in the hospital. Afebrile. 

HDS. Still on 3L Sat in mid 90s. Leg edema much improved.


215.4-> 212 kg (467lbs), had been 460 with PCP ~10 days ago. 





Social History: Unchanged from Admission


Past Medical History: Unchanged from Admission





Objective


Active Medications: 








Acetaminophen (Tylenol Tab*)  650 mg PO Q6H PRN


   PRN Reason: PAIN


   Last Admin: 05/15/18 09:05 Dose:  650 mg


Albuterol (Ventolin Hfa Inhaler*)  2 puff INH Q4HR PRN


   PRN Reason: SHORTNESS OF BREATH


Aspirin (Aspirin 81 Mg Chew Tab*)  81 mg PO DAILY [facility]


   Last Admin: 05/15/18 09:02 Dose:  81 mg


Device (Nicotine Mouth Piece*)  1 each INH .USE WITH NICOTROL PRN


   PRN Reason: CRAVING


Dextrose (D50w Syringe 50 Ml*)  12.5 gm IV PUSH .FOR FS < 60 - SS PRN


   PRN Reason: FS < 60


Enoxaparin Sodium (Lovenox(*))  40 mg SUBCUT Q24H [facility]


   Last Admin: 05/15/18 14:07 Dose:  40 mg


Furosemide (Lasix Iv*)  40 mg IV SLOW PU 0800,1700 [facility]


   Last Admin: 05/15/18 17:15 Dose:  40 mg


Insulin Human Lispro (Humalog*)  0 units SUBCUT ACHS [facility]


   PRN Reason: Protocol


   Last Admin: 05/15/18 20:38 Dose:  6 units


Lisinopril (Prinivil Tab*)  10 mg PO DAILY [facility]


   Last Admin: 05/15/18 09:02 Dose:  10 mg


Metoprolol Tartrate (Lopressor Tab*)  12.5 mg PO Q12HR [facility]


   Last Admin: 05/15/18 20:39 Dose:  12.5 mg


Nicotine (Nicotine Inhaler*)  10 mg INH Q2H PRN


   PRN Reason: CRAVING


Nicotine (Nicotine Patch 14 Mg/24 Hr*)  1 patch TRANSDERM DAILY [facility]


   Last Admin: 05/15/18 12:55 Dose:  1 patch


Nystatin (Nystatin Cream*)  1 applic TOPICAL TID [facility]


   Last Admin: 05/15/18 20:41 Dose:  1 applic


Pharmacy Profile Note (Nicotine Patch Removal Note*)  1 note PATCH OFF 2100 [facility]


   Last Admin: 05/15/18 20:40 Dose:  1 note








 Vital Signs - 8 hr











  05/16/18 05/16/18 05/16/18





  03:46 07:31 08:00


 


Temperature 98.1 F 97.9 F 


 


Pulse Rate 96 91 


 


Respiratory 22 16 18





Rate   


 


Blood Pressure 131/60 137/67 





(mmHg)   


 


O2 Sat by Pulse 94 95 





Oximetry   











Oxygen Devices in Use Now: Nasal Cannula


Appearance: NAD, super morbid obesity


Eyes: No Scleral Icterus, PERRLA


Ears/Nose/Mouth/Throat: NL Teeth, Lips, Gums, Mucous Membranes Moist


Neck: NL Appearance and Movements; NL JVP


Respiratory: Symmetrical Chest Expansion and Respiratory Effort, Clear to 

Auscultation, - - distant lung sounds


Cardiovascular: NL Sounds; No Murmurs; No JVD


Abdominal: NL Sounds; No Tenderness; No Distention, No Hepatosplenomegaly


Extremities: - - trace edema. 


Skin: No Rash or Ulcers, - - venous stasis changes. 


Neurological: Alert and Oriented x 3, NL Sensation, NL Muscle Strength and Tone


Nutrition: Taking PO's


Result Diagrams: 


 05/15/18 04:50





 05/15/18 05:08


Additional Lab and Data: 











 Laboratory Results - last 24 hr











  05/15/18 05/15/18 05/15/18





  05:08 11:47 16:38


 


POC Glucose (mg/dL)   224 H  214 H


 


Hemoglobin A1c  9.5 H  














  05/15/18 05/16/18





  20:30 07:49


 


POC Glucose (mg/dL)  240 H  199 H


 


Hemoglobin A1c  

















Assess/Plan/Problems-Billing


Assessment: 





43 yo man with history of systolic heart failure and morbid obesity admitted 

with shortness of breath, dyspnea on exertion, and 15 pound weight gain in 2 

weeks. 





- Patient Problems


(1) Acute on chronic systolic (congestive) heart failure


Current Visit: Yes   Status: Acute   Code(s): I50.23 - ACUTE ON CHRONIC 

SYSTOLIC (CONGESTIVE) HEART FAILURE   SNOMED Code(s): 722425596


   Comment: Likely due to recent excess fluid intake in setting of UTI 


Patient reports 15 lbs up from baseline ("dry weight" is 460lbs) 


Down 3.4kgs  since yesterday, continue daily weights and strict In/Out 


Etiology is unclear.  Unfortunately a LHC cannot be performed due to his weight 

and limitations of the cath table.  per nuclear medicine--the stress test table 

cannot accommodate a weight over 440lbs. 


The treatment at this time will be medical management 


continue beta blocker to his home ACe inhibitor yesterday 


, HDL 44. He has 10 year ASCVD risk of 10.2%, high intensity statin 

recommend especially as can not get stress or LHC currently.    





(2) BMI 60.0-69.9, adult


Current Visit: Yes   Status: Acute   Code(s): Z68.44 - BODY MASS INDEX (BMI) 

60.0-69.9, ADULT   SNOMED Code(s): 038665204


   Comment: recommend life style modification. 


plan referral for  consideration for bariatric surgery   





(3) Diabetes


Current Visit: Yes   Status: Acute   Code(s): E11.9 - TYPE 2 DIABETES MELLITUS 

WITHOUT COMPLICATIONS   SNOMED Code(s): 97391065


   Comment: A1c pending


continue lispro sliding scale for now   





(4) GILLES (obstructive sleep apnea)


Current Visit: Yes   Status: Acute   Code(s): G47.33 - OBSTRUCTIVE SLEEP APNEA (

ADULT) (PEDIATRIC)   SNOMED Code(s): 14637630


   Comment: does not wear cpap


this may also be contributing to heart failure    





(5) HTN (hypertension)


Current Visit: Yes   Status: Acute   Code(s): I10 - ESSENTIAL (PRIMARY) 

HYPERTENSION   SNOMED Code(s): 21550366


   Comment: continue BB, ACEI. PRINCIPAL PROCEDURE  Procedure: Laparoscopic sleeve gastrectomy  Findings and Treatment:

## 2021-06-11 NOTE — ED
Physical Therapy  9ST  PT Acute Evaluation     Therapy Triage Evaluation: OT evaluation needed due to a decline in one or more of the following: ADL independence, safety, functional ambulation, balance, strength                                                 Visit Type: initial evaluation  Treatment diagnosis: Gait disturbance  Precautions:  Medical precautions:  fall risk;.   Lines:     Basic: telemetry and capped IV    SUBJECTIVE  Patient agreed to participate in therapy this date.  Patient denies pain, notes dizziness is ongoing - \"tired of  It\" - inquires as to plan \"about that tumor they found\" -   RN Chelo aware of therapy  Prior treatment in the past year for same condition: no therapiesPatient has not been hospitalized, in a skilled nursing facility, or seen by home health in the last 30 days.  Patient / Family Goal: return home     OBJECTIVE     Oriented to person, place and time     Arousal alertness: appropriate responses to stimuli  Patient activity tolerance: 1 to 2 activity to rest  Range of Motion (measured in degrees unless otherwise noted, active unless indicated)  WFL: LUE RUE  WFL: RLE, LLE  Strength (out of 5 unless otherwise indicated)   5/5: RLE, LLE  Balance    Sitting: Static: modified independent, Dynamic: modified independent    Standing - Firm Surface - Eyes Open: Static: contact guard/touching/steadying assist Dynamic: moderate assist  Balance Details: Wide EMILY in standing, unsteady without support  Unable to SLS without max support  Neurological Comments / Details: Denies numbness tingling  Vague light touch LE    Bed Mobility:    Rolling left: modified independent    Rolling right: modified independent    Repositioning in bed: modified independent      Side-lying to sit: modified independent    Sit to side-lying: modified independent  Training completed:    Tasks: all aspects of bed mobility    Education details: body mechanics and patient safety  Transfers:    Assistive devices:  Prieto HANSEN Jennifer, scribed for Nicolas Gracia MD on 05/01/18 at 1104 .





Complex/Multi-Sys Presentation





- HPI Summary


HPI Summary: 





The patient is a 42 year old male who presents with intermittent right side pain

, back pain, chest pain, and leg pain that worsened yesterday. He rates his 

pain an 8/10. The patient reports he vomited and had a fever this morning. He 

describes he had sharp/stabbing chest pain in his left shoulder that began 

about 5 hours ago. His leg pain is located mainly in his upper left leg. The 

patient additionally complains of dysuria, penile discharge, shortness of breath

, coughing, and sneezing. He denies hematuria.





- History Of Current Complaint


Chief Complaint: EDChestPainROMI


Time Seen by Provider: 05/01/18 10:30


Hx Obtained From: Patient


Onset/Duration: Sudden Onset, Lasting Days - 4 days, Still Present, Worse Since 

- yesterday


Timing: Constant


Severity Currently: Moderate


Severity Initially: Moderate


Location: Pain At: - right side, right back, chest, left leg


Character: Sharp - sharp/stabbing chest pain in upper left shoulder


Associated Signs And Symptoms: Positive: Other - right side pain, back pain, 

chest pain, leg pain, vomiting, fever, dysuria, discharge, shortness of breath, 

coughing, sneezing. NEGATIVE: hematuria


Related History: Recent Illness - four days ago dx UTI





- Allergies/Home Medications


Allergies/Adverse Reactions: 


 Allergies











Allergy/AdvReac Type Severity Reaction Status Date / Time


 


[place] Allergy Severe Swelling Uncoded 05/01/18 09:44











Home Medications: 


 Home Medications





Albuterol HFA INHALER* [Ventolin HFA Inhaler*] 2 puff INH Q4HR PRN 05/01/18 [

History Confirmed 05/01/18]


Ciprofloxacin TAB* [Cipro 500 MG TAB*] 500 mg PO BID 05/01/18 [History 

Confirmed 05/01/18]


Glimepiride (NF) 8 mg PO QAM 05/01/18 [History Confirmed 05/01/18]


Lisinopril TAB* [Prinivil TAB*] 10 mg PO DAILY 05/01/18 [History Confirmed 05/01 /18]











PMH/Surg Hx/FS Hx/Imm Hx


Endocrine/Hematology History: Reports: Hx Anticoagulant Therapy, Hx Diabetes - 

DM x 3 years.  On Rx, FBS ,


   Denies: Hx Thyroid Disease


Cardiovascular History: 


   Denies: Hx Congestive Heart Failure, Hx Deep Vein Thrombosis, Hx Hypertension

, Hx Myocardial Infarction, Hx Pacemaker/ICD


Respiratory History: Reports: Hx Sleep Apnea


   Denies: Hx Asthma, Hx Chronic Obstructive Pulmonary Disease (COPD), Hx Lung 

Cancer


GI History: 


   Denies: Hx Gall Bladder Disease, Hx Gastrointestinal Bleed, Hx Ulcer, Hx 

Urosepsis


 History: 


   Denies: Hx Kidney Stones, Hx Renal Disease


Musculoskeletal History: Reports: Hx Back Problems


Neurological History: 


   Denies: Hx Dementia, Hx Migraine, Hx Seizures, Hx Transient Ischemic Attacks 

(TIA)


Psychiatric History: Reports: Hx Depression


   Denies: Hx Anxiety, Hx Schizophrenia, Hx Bipolar Disorder





- Immunization History


Date of Tetanus Vaccine: utd


Date of Influenza Vaccine: none


Infectious Disease History: No


Infectious Disease History: 


   Denies: Hx Hepatitis, Hx Human Immunodeficiency Virus (HIV), Traveled 

Outside the [place] in Last 30 Days





- Family History


Known Family History: Positive: Cardiac Disease, Hypertension, Diabetes, 

Respiratory Disease, Seizure Disorder





- Social History


Alcohol Use: Rare


Substance Use Type: Reports: Marijuana


Substance Use Comment - Amount & Last Used: 5/19/17


Hx Tobacco Use: Yes


Smoking Status (MU): Current Every Day Smoker


Type: Cigarettes


Amount Used/How Often: 1 PPD





Review of Systems


Positive: Fever.  Negative: Chills


Negative: Erythema


ENT: Other - Sneezing


Negative: Sore Throat


Positive: Chest Pain


Positive: Shortness Of Breath, Cough


Positive: Vomiting.  Negative: Abdominal Pain, Nausea


Positive: dysuria, discharge.  Negative: hematuria


Positive: Myalgia - leg pain, back pain, Edema


Negative: Rash


Neurological: Negative - Dizziness


All Other Systems Reviewed And Are Negative: Yes





Physical Exam





- Summary


Physical Exam Summary: 





Constitutional: Morbidly obese, Alert. (-) Distressed


Skin: Warm, Dry


HENT: Normocephalic; Atraumatic


Eyes: Conjunctiva normal


Neck: Musculoskeletal ROM normal neck. (-) JVD, (-) Stridor, (-) Tracheal 

deviation


Cardio: No reproducible chest pain. Rhythm regular, rate normal, Heart sounds 

normal; Intact distal pulses; The pedal pulses are 2+ and symmetric. Radial 

pulses are 2+ and symmetric. (-) Murmur


Pulmonary/Chest wall: Effort normal. (-) Respiratory distress, (-) Wheezes, (-) 

Rales


Abd: Soft, (-) Tenderness, (-) Distension, (-) Guarding, (-) Rebound


Musculoskeletal: Bilateral lower extremity edema with venous stasis skin changes

, swelling bilateral, no focal swelling


Genitourinary: reduced the redudant skin. There was no obvious penile lesions 

or discharge. Tests are nontender. No signs of hernias, gangrene. 


Lymph: (-) Cervical adenopathy


Neuro: Alert, Oriented x3


Psych: Mood and affect Normal


Triage Information Reviewed: Yes


Vital Signs On Initial Exam: 


 Initial Vitals











Temp Pulse Resp BP Pulse Ox


 


 98.1 F   119   16   115/70   93 


 


 05/01/18 09:45  05/01/18 09:45  05/01/18 09:45  05/01/18 09:45  05/01/18 09:45











Vital Signs Reviewed: Yes





Diagnostics





- Vital Signs


 Vital Signs











  Temp Pulse Resp BP Pulse Ox


 


 05/01/18 09:45  98.1 F  119  16  115/70  93














- Laboratory


Result Diagrams: 


 05/01/18 11:15





 05/01/18 11:15


Lab Statement: Any lab studies that have been ordered have been reviewed, and 

results considered in the medical decision making process.





- Radiology


  ** CXR


Xray Interpretation: No Acute Changes - No radiographic evidence for acute 

cardiopulmonary abnormality on this portable chest x-ray. Dr. Gracia has 

reviewed this report.


Radiology Interpretation Completed By: Radiologist





- CT


  ** CT Abd/Pel


CT Interpretation: No Acute Changes - 1. Negative for urolithiasis or 

hydronephrosis. 2. Mild sigmoid diverticulosis without findings of 

diverticulitis. 3. Fatty infiltration of the liver. Dr. Gracia has reviewed 

this report.


CT Interpretation Completed By: Radiologist





  ** Chest/Thorax CTA


CT Interpretation: No Acute Changes - VERY LIMITED EXAMINATION DUE TO PATIENT 

SIZE. NO CENTRAL PULMONARY EMBOLUS. LUNGS CLEAR.  LIMITED NATURE OF THIS 

EXAMINATION DISCUSSED WITH THE EMERGENCY DEPARTMENT. Dr. Gracia has reviewed 

this report.


CT Interpretation Completed By: Radiologist





- EKG


  ** 11:14


Cardiac Rate: Tachycardia


EKG Rhythm: Sinus Tachycardia - 105 BPM


EKG Interpretation: no STEMI





- Additional Comments


Diagnostic Additional Comments: 





Gallbladder US. Interpreted by a radiologist.


IMPRESSION: 1.  LIMITED STUDY.


2.  HEPATOMEGALY WITH FATTY INFILTRATION OF THE LIVER. Dr. Gracia has reviewed 

this report.





Venous Doppler Study. Interpreted by a radiologist.


IMPRESSION: No sonographic evidence of deep vein thrombosis. Dr. Gracia has 

reviewed this report.








Re-Evaluation





- Re-Evaluation


  ** First Eval


Re-Evaluation Time: 13:07


Change: Unchanged


Comment: I did a genitourinary physical exam. I reduced the redudant skin. 

There was no obvious penile lesions or discharge. Tests are nontender. No signs 

of hernias, gangrene. Chest pressure is still 2/10 intensity.





  ** Second Eval


Re-Evaluation Time: 15:07


Change: Unchanged


Comment: The patient feels better and wants to go home AMA.





Complex Multi-Symp Course/Dx


Course Of Treatment: The patient is a 42 year old male who presents with 

intermittent right side pain, back pain, chest pain, and leg pain that worsened 

yesterday. He rates his pain an 8/10. In the ED course the patient was given IV 

fluids, aspirin, cipro, dilaudid, NTG. Bloodwork was obtained. EKG showed 

tachycardia at 105 BPM. CXR, CT Abd/Pel, CTA Chest/Thorax were obtained. The 

patient is diagnosed with chest pain unspecified, right flank pain, and left 

leg pain. His admittance was accepted by Dr. Porfirio Kennedy, hospitalist. However, 

the patient left AMA. He is at risk for pulmonary embolism and heart attack 

that need further work up and treatment. The patient understands the risks, 

including disability and death. He is encouraged to return to the ED if he 

changes his mind and to follow up with his family doctor.





- Diagnoses


Provider Diagnoses: 


 Chest pain, unspecified, Right flank pain, Left leg pain, Left against medical 

advice








- Physician Notifications


Discussed Care Of Patient With: Porfirio Kennedy


Time Discussed With Above Provider: 13:21


Instructed by Provider To: Admit As Inpatient





Discharge





- Sign-Out/Discharge


Documenting (check all that apply): Discharge/Admit/Transfer





- Discharge Plan


Condition: Fair


Disposition: AGAINST MEDICAL ADVICE


Referrals: 


Kirstin HUTCHINSON,Hari GARCIA [Primary Care Provider] - 


Additional Instructions: 


RETURN TO THE EMERGENCY DEPARTMENT FOR CHANGING OR WORSENING SYMPTOMS.





The documentation as recorded by the Prieto bey Jennifer accurately reflects 

the service I personally performed and the decisions made by me, Nicolas Gracia MD. 2-wheeled walker, 1 person and gait belt    Sit to stand: contact guard/touching/steadying assist and with verbal cues    Stand to sit: contact guard/touching/steadying assist and with verbal cues    Stand pivot: contact guard/touching/steadying assist and with verbal cues  Training completed:      Education details: body mechanics and patient safety    Uses wide EMILY in sit<>stand, hand  on walker - disinclined to push up on bed/chair for arise, or use hands to help control descent  Gait/Ambulation:     Assistance: contact guard/touching/steadying assist and minimal assist   Assistive device: gait belt and 2-wheeled walker    Distance (ft): 100; 15  Training Completed:    Tasks: gait training on level surfaces    Education details: body mechanics and patient safety    Short shallow stepping though reciprocal, diminishing stride and pace with distance, onset fatigue, notes \"It gets blurry\"        Interventions     Training provided: activity tolerance, balance retraining, bed mobility training, body mechanics, functional ambulation, neuromuscular reeducation, positioning and transfer training    Skilled input: Verbal instruction/cues and tactile instruction/cues  Verbal Consent: Writer verbally educated and received verbal consent for hand placement, positioning of patient, and techniques to be performed today from patient for clothing adjustments for techniques, hand placement and palpation for techniques and therapist position for techniques as described above and how they are pertinent to the patient's plan of care.       ASSESSMENT    Impairments: balance deficits, activity tolerance and shortness of breath  Functional Limitations: all functional mobility  PT Eval following admission related to dizziness, nausea/vomiting; likely meningioma per MRI 6/8, s/p fistula placement 6/10 ; Med hx includes CKD Stage 5, DM with neuropathy, DJD, HTN, paranoid schizophrenia;  Pt lives with robert who is currently away  but also has a brother nearby ; lives in apt setting with 12 step entry - pt reports PLF indep/mod indep ADL and IADL, using a 2ww per dizziness and off balance - used to drive but not much right now;    Patient currently requires cga<>min assist in transfers and gait due to balance deficit, presents limited activity tolerance with onset fatigue as well as increase in c/o dizziness; he will benefit from a course of skilled PT to maximize balance and gait safety, assess stairs as indicated     Discharge Recommendations  Recommendation for Discharge: PT WI: Home, Home therapy                PT Identified Barriers to Discharge: medical, balance   Skilled therapy is required to address these limitations in attempt to maximize the patient's independence.  Progress: improving as expected  Predicted patient presentation: Moderate (evolving) - Patient comorbidities and complexities, as defined above, may have varying impact on steady progress for prescribed plan of care.    End of Session:   Location: in chair  Safety measures: alarm system in place/re-engaged and call light within reach  Handoff to: nurse    PLAN    Suggestions for next session as indicated: Balance, functional gait , stairs as indicated  Frequency Comments: X MWF (6/11)    Interventions: balance, gait training, neuromuscular re-education, strengthening, endurance training, functional transfer training, stairs retraining and patient/family training  Agreement to plan and goals: patient agrees with goals and treatment plan        GOALS  Review Date: 6/14/2021  Long Term Goals: (to be met by time of discharge from hospital)  Sit to stand: Patient will complete sit to stand transfer with 2-wheeled walker, modified independent.   Stand to sit: Patient will complete stand to sit transfer with 2-wheeled walker, modified independent.   Stand pivot: Patient will complete stand pivot transfer with 2-wheeled walker, modified independent.   Ambulation (even):  Patient will ambulate on even surface for 200 feet with 2-wheeled walker, modified independent.   Flight of stairs: Patient will ambulate a flight of stairs with 2-wheeled walker, 1 person and gait belt supervision.     Documented in the chart in the following areas: Prior Level of Function. Assessment.      Admitting diagnosis: Dizziness (R42);Brain mass (G93.89);Nausea and vomiting in adult (R11.2)    Co-morbidities and problem list:   Patient Active Problem List:   Chronic kidney disease (CKD), stage III (moderate) (CMS/[facility])   Essential (primary) hypertension   CKD (chronic kidney disease) stage 5, GFR less than 15 ml/min (CMS/[facility])    Treatment Diagnosis: Gait disturbance    The referring provider's electronic signature on the evaluation authorizes the therapy plan of care and certifies the need for these services, furnished under this plan of care while under their care.      Therapy procedure time and total treatment time can be found documented on the Time Entry flowsheet